# Patient Record
Sex: FEMALE | Race: OTHER | Employment: PART TIME | URBAN - METROPOLITAN AREA
[De-identification: names, ages, dates, MRNs, and addresses within clinical notes are randomized per-mention and may not be internally consistent; named-entity substitution may affect disease eponyms.]

---

## 2023-04-27 PROBLEM — F50.81 BINGE EATING DISORDER: Status: ACTIVE | Noted: 2021-12-10

## 2023-04-27 PROBLEM — F50.819 BINGE EATING DISORDER: Status: ACTIVE | Noted: 2021-12-10

## 2023-05-26 DIAGNOSIS — O99.212 OBESITY AFFECTING PREGNANCY IN SECOND TRIMESTER: ICD-10-CM

## 2023-05-26 DIAGNOSIS — Z34.92 PRENATAL CARE, SECOND TRIMESTER: ICD-10-CM

## 2023-05-26 PROBLEM — F50.81 BINGE EATING DISORDER: Status: RESOLVED | Noted: 2021-12-10 | Resolved: 2023-05-26

## 2023-05-26 PROBLEM — F50.819 BINGE EATING DISORDER: Status: RESOLVED | Noted: 2021-12-10 | Resolved: 2023-05-26

## 2023-05-26 LAB — GLUCOSE, 1HR PP: 126 MG/DL (ref 60–140)

## 2023-07-14 NOTE — RESULT ENCOUNTER NOTE
6/27/23 [facility] US:  23w 4d, EDC 10/20/23. Cephalic presentation, anatomy Complete and WNL.  grams (1lb. 6oz) at 55%. ANDRÉS WNL. Posterior, Grade 1 placenta. Cervical length 4.3 cm.     Impression:    Fetal Anatomy complete and WNL  Reassuring cervical length  Appropriate interval growth - 55%    Recommendation:  Growth US every 4 weeks  Weekly BPP at 32 weeks

## 2023-07-27 ENCOUNTER — HOSPITAL ENCOUNTER (OUTPATIENT)
Dept: ULTRASOUND IMAGING | Age: 28
Discharge: HOME | End: 2023-07-29
Attending: ADVANCED PRACTICE MIDWIFE
Payer: COMMERCIAL

## 2023-07-27 DIAGNOSIS — Z36.89 ENCOUNTER FOR ULTRASOUND TO ASSESS INTERVAL GROWTH OF FETUS: ICD-10-CM

## 2023-07-27 DIAGNOSIS — O99.212 OBESITY AFFECTING PREGNANCY IN SECOND TRIMESTER: ICD-10-CM

## 2023-07-27 PROCEDURE — 76815 OB US LIMITED FETUS(S): CPT

## 2023-07-28 NOTE — RESULT ENCOUNTER NOTE
7/27/23 US:  28w 0d, EDC 10/19/23. Breech presentation, anatomy WNL. EFW 1129 grams (2lb. 8oz) at 59%. ANDRÉS 18 cm. Posterior placenta, not low-lying. Cervical length 3.6 cm.     Impression:    Fetal Anatomy WNL  Reassuring cervical length  Appropriate interval growth - 59%

## 2023-08-18 ENCOUNTER — ROUTINE PRENATAL (OUTPATIENT)
Dept: OBGYN CLINIC | Age: 28
End: 2023-08-18

## 2023-08-18 VITALS
DIASTOLIC BLOOD PRESSURE: 76 MMHG | SYSTOLIC BLOOD PRESSURE: 138 MMHG | WEIGHT: 293 LBS | BODY MASS INDEX: 49.34 KG/M2 | HEART RATE: 89 BPM

## 2023-08-18 DIAGNOSIS — Z3A.30 30 WEEKS GESTATION OF PREGNANCY: ICD-10-CM

## 2023-08-18 DIAGNOSIS — Z34.93 PRENATAL CARE, THIRD TRIMESTER: Primary | ICD-10-CM

## 2023-08-18 DIAGNOSIS — O99.212 OBESITY AFFECTING PREGNANCY IN SECOND TRIMESTER: ICD-10-CM

## 2023-08-19 NOTE — PROGRESS NOTES
SUBJECTIVE:  Denies bleeding, spotting, leaking of fluid, abnormal discharge. Good fetal movement  Beginning to feel more pelvic pressure, discomfort when standing/walking for longer periods of time. Work does allow her to sit/rest when needed. Discussed scheduling her next growth US (8/22/23) and scheduling out her weekly BPP's starting on 9/1/23. Review of Systems   Eyes:  Negative for visual disturbance. Respiratory:  Negative for shortness of breath. Gastrointestinal:  Negative for abdominal pain, constipation, diarrhea, nausea and vomiting. Genitourinary:  Negative for dysuria, vaginal bleeding and vaginal discharge. Neurological:  Negative for headaches. OBJECTIVE:  Physical Exam  Appearance:  Normal appearance  Cardiovascular:  Normal rate, Capillary refill less than 2 seconds  Pulmonary:  Normal effort, no distress  Abdominal:  No tenderness  MS:  No Swelling, No dependent edema  Skin:  Warm, dry  Neuro:  Alert and oriented x3, reflexes normal.  Psychiatric:  Normal mood and behavior    ASSESSMENT:  34 y.o. female [id number] IUP at 30w4d    Patient Active Problem List    Diagnosis Date Noted    Obesity affecting pregnancy in second trimester 05/26/2023    Shoulder dystocia, delivered 04/27/2023      Diagnosis Orders   1. Prenatal care, third trimester        2. 30 weeks gestation of pregnancy        3. Obesity affecting pregnancy in second trimester    [place] [address] NON STRESS TESTING    US OB 1 OR MORE FETUS LIMITED          PLAN:  Growth US every 4 weeks - 8/22/23, 9/19/23, 10/17/23  Weekly BPP starting at 32 weeks - 8/9/23 x8 weeks  Weekly PNV with NST starting at 32 weeks through 10/20/23    Follow-Up  Return in 2 weeks (on 9/1/2023) for weekly weekly prenatal visits with NST through 10/20/23.     EVY Salazar CNM

## 2023-08-29 ENCOUNTER — HOSPITAL ENCOUNTER (OUTPATIENT)
Age: 28
Discharge: HOME | End: 2023-08-29
Attending: ADVANCED PRACTICE MIDWIFE | Admitting: OBSTETRICS & GYNECOLOGY
Payer: COMMERCIAL

## 2023-08-29 ENCOUNTER — HOSPITAL ENCOUNTER (OUTPATIENT)
Dept: ULTRASOUND IMAGING | Age: 28
Discharge: HOME | End: 2023-08-31
Attending: ADVANCED PRACTICE MIDWIFE
Payer: COMMERCIAL

## 2023-08-29 VITALS
DIASTOLIC BLOOD PRESSURE: 72 MMHG | HEART RATE: 87 BPM | TEMPERATURE: 98.8 F | RESPIRATION RATE: 18 BRPM | SYSTOLIC BLOOD PRESSURE: 129 MMHG

## 2023-08-29 DIAGNOSIS — O99.212 OBESITY AFFECTING PREGNANCY IN SECOND TRIMESTER: ICD-10-CM

## 2023-08-29 PROBLEM — R10.2 PELVIC PRESSURE IN PREGNANCY, ANTEPARTUM, THIRD TRIMESTER: Status: ACTIVE | Noted: 2023-08-29

## 2023-08-29 PROBLEM — O26.893 PELVIC PRESSURE IN PREGNANCY, ANTEPARTUM, THIRD TRIMESTER: Status: ACTIVE | Noted: 2023-08-29

## 2023-08-29 LAB
AMPHET UR QL SCN: NORMAL
BACTERIA URNS QL MICRO: NEGATIVE /HPF
BARBITURATES UR QL SCN: NORMAL
BENZODIAZ UR QL SCN: NORMAL
BILIRUB UR QL STRIP: NEGATIVE
CANNABINOIDS UR QL SCN: NORMAL
CLARITY UR: CLEAR
COCAINE UR QL SCN: NORMAL
COLOR UR: YELLOW
DRUG SCREEN COMMENT UR-IMP: NORMAL
EPI CELLS #/AREA URNS AUTO: ABNORMAL /HPF (ref 0–5)
FENTANYL SCREEN, URINE: NORMAL
GLUCOSE UR STRIP-MCNC: NEGATIVE MG/DL
HGB UR QL STRIP: NEGATIVE
HYALINE CASTS #/AREA URNS AUTO: ABNORMAL /HPF (ref 0–5)
KETONES UR STRIP-MCNC: 15 MG/DL
LEUKOCYTE ESTERASE UR QL STRIP: ABNORMAL
METHADONE UR QL SCN: NORMAL
NITRITE UR QL STRIP: NEGATIVE
OPIATES UR QL SCN: NORMAL
OXYCODONE UR QL SCN: NORMAL
PCP UR QL SCN: NORMAL
PH UR STRIP: 6 [PH] (ref 5–9)
PROPOXYPH UR QL SCN: NORMAL
PROT UR STRIP-MCNC: NEGATIVE MG/DL
RBC #/AREA URNS AUTO: ABNORMAL /HPF (ref 0–5)
SP GR UR STRIP: 1.02 (ref 1–1.03)
URINE REFLEX TO CULTURE: ABNORMAL
UROBILINOGEN UR STRIP-ACNC: 1 E.U./DL
WBC #/AREA URNS AUTO: ABNORMAL /HPF (ref 0–5)

## 2023-08-29 PROCEDURE — 99283 EMERGENCY DEPT VISIT LOW MDM: CPT

## 2023-08-29 PROCEDURE — 76815 OB US LIMITED FETUS(S): CPT

## 2023-08-29 PROCEDURE — 59025 FETAL NON-STRESS TEST: CPT

## 2023-08-29 PROCEDURE — 99283 EMERGENCY DEPT VISIT LOW MDM: CPT | Performed by: OBSTETRICS & GYNECOLOGY

## 2023-08-29 PROCEDURE — 80307 DRUG TEST PRSMV CHEM ANLYZR: CPT

## 2023-08-29 PROCEDURE — 81001 URINALYSIS AUTO W/SCOPE: CPT

## 2023-08-29 PROCEDURE — 59025 FETAL NON-STRESS TEST: CPT | Performed by: OBSTETRICS & GYNECOLOGY

## 2023-08-29 NOTE — ED NOTES
Department of Obstetrics and Gynecology   Emergency Department, OB triage    Pt Name: Boby Echols  MRN: [id number] 5 [facility], [place] #: [de-identified]  YOB: 1995  Estimated Date of Delivery: 10/24/23      HPI: The patient is a 34 y.o. [id number] 32w0d female who presents to [facility] triage for vaginal pressure. Pt with h/o TSVD x 2.   +FM, -VB, -LOF, -CTX    Allergies: Allergies as of 08/29/2023 - Fully Reviewed 08/29/2023   Allergen Reaction Noted    Shellfish allergy  11/06/2022    Shellfish-derived products Other (See Comments) 03/10/2023       Medications:  No current facility-administered medications for this encounter. Current Outpatient Medications:     penicillin v potassium (VEETID) 500 MG tablet, TAKE 1 TABLET BY MOUTH FOUR TIMES DAILY UNTIL GONE (Patient not taking: Reported on 8/29/2023), Disp: , Rfl:     ondansetron (ZOFRAN-ODT) 4 MG disintegrating tablet, DISSOLVE 1 TABLET ON TOP OF YOUR TONGUE, THEN SWALLOW EVERY 4 HOURS AS NEEDED TO RELIEVE NAUSEA AND VOMITING, Disp: 30 tablet, Rfl: 2    aspirin (ASPIRIN CHILDRENS) 81 MG chewable tablet, Take 1 tablet by mouth daily, Disp: 90 tablet, Rfl: 1    PAIN RELIEVER EXTRA STRENGTH 500 MG tablet, , Disp: , Rfl:     cyclobenzaprine (FLEXERIL) 10 MG tablet, , Disp: , Rfl:     lidocaine (LIDODERM) 5 %, , Disp: , Rfl:     Prenatal Vit-Fe Fumarate-FA (PRENATAL VITAMIN) 27-0.8 MG TABS, Take 1 tablet by mouth daily, Disp: 90 tablet, Rfl: 3    OB History: [id number]    Gyn History: Denies h/o abnormal pap smear, h/o STDs. Past Medical History:   Past Medical History:   Diagnosis Date    Anxiety and depression 2023    Asthma 2005    Binge eating disorder 12/10/2021       Past Surgical History: No past surgical history on file. Social History:   Social History     Tobacco Use   Smoking Status Never   Smokeless Tobacco Never        Family History: Noncontributory; Denies h/o cancer.      ROS:  Negative except as stated in HPI, denies nausea, vomiting, fever,

## 2023-08-29 NOTE — PROGRESS NOTES
Pt here with complaints of pressure in lower abdomen and vagina. Pt states about a week ago she \"peed herself\" a small amount ,and her dr told her it was normal because babys head is low. Pt states she has never felt this much pressure and it is uncomfortable to walk. Urine sample provided and pt getting into gown.

## 2023-08-29 NOTE — PROGRESS NOTES
Discharge instructions  and labor precautions reviewed, pt states understanding, states  is waiting in lobby and they are going to u/s  pt given water pitcher to prepare for u/s

## 2023-09-01 ENCOUNTER — ROUTINE PRENATAL (OUTPATIENT)
Dept: OBGYN CLINIC | Age: 28
End: 2023-09-01

## 2023-09-01 VITALS
HEART RATE: 98 BPM | WEIGHT: 293 LBS | SYSTOLIC BLOOD PRESSURE: 118 MMHG | BODY MASS INDEX: 49.65 KG/M2 | DIASTOLIC BLOOD PRESSURE: 70 MMHG

## 2023-09-01 DIAGNOSIS — O99.213 OBESITY AFFECTING PREGNANCY IN THIRD TRIMESTER: ICD-10-CM

## 2023-09-01 DIAGNOSIS — Z3A.32 32 WEEKS GESTATION OF PREGNANCY: ICD-10-CM

## 2023-09-01 DIAGNOSIS — Z36.89 NST (NON-STRESS TEST) REACTIVE: ICD-10-CM

## 2023-09-01 DIAGNOSIS — Z34.93 PRENATAL CARE, THIRD TRIMESTER: Primary | ICD-10-CM

## 2023-09-01 NOTE — RESULT ENCOUNTER NOTE
8/29/23 US:  32w 5d, EDC 10/19/23. Variable presentation, anatomy WNL. EFW 1987 grams (4lb. 6oz) at 56%. ANDRÉS 19.6 cm. Posterior placenta, not low-lying.     Impression:    Fetal Anatomy WNL  Reassuring cervical length  Appropriate interval growth - 56%

## 2023-09-01 NOTE — PROGRESS NOTES
SUBJECTIVE:  Denies bleeding, spotting, leaking of fluid, abnormal discharge. Good fetal movement  She has not completed 28 week labs, strongly encouraged her to get this done next week. Review of Systems   Eyes:  Negative for visual disturbance. Respiratory:  Negative for shortness of breath. Gastrointestinal:  Negative for abdominal pain, constipation, diarrhea, nausea and vomiting. Genitourinary:  Negative for dysuria, vaginal bleeding and vaginal discharge. Neurological:  Negative for headaches. OBJECTIVE:  NST:    NST Indication - Obesity complicating pregnancy  Total Run Time - 20 minutes  FHR - 135, moderate variability, 15x15 accelerations, No decelerations. Uterus - No contractions, resting tone soft  NST Interventions - None  Fetal Response - N/A  NST Outcome - reactive    8/29/23 US:  Coty Carrillo, EDC 10/19/23. Variable presentation, anatomy WNL. EFW 1987 grams (4lb. 6oz) at 56%. ANDRÉS 19.6 cm. Posterior placenta, not low-lying. Physical Exam  Appearance:  Normal appearance  Cardiovascular:  Normal rate, Capillary refill less than 2 seconds  Pulmonary:  Normal effort, no distress  Abdominal:  No tenderness  MS:  No Swelling, No dependent edema  Skin:  Warm, dry  Neuro:  Alert and oriented x3, reflexes normal.  Psychiatric:  Normal mood and behavior    ASSESSMENT:  34 y.o. female [id number] IUP at 32w3d  Fetal Anatomy WNL  Reassuring cervical length  Appropriate interval growth - 56%     Patient Active Problem List    Diagnosis Date Noted    Pelvic pressure in pregnancy, antepartum, third trimester 08/29/2023    Obesity affecting pregnancy in third trimester 05/26/2023     16wk 1-hour GTT:  126  28wk 1-hour GTT:  ______        Shoulder dystocia, delivered 04/27/2023      Diagnosis Orders   1. Prenatal care, third trimester        2. 32 weeks gestation of pregnancy        3.  Obesity affecting pregnancy in third trimester  04703 - [place] FETAL NON-STRESS TEST      4. NST (non-stress test)

## 2023-09-05 ENCOUNTER — HOSPITAL ENCOUNTER (OUTPATIENT)
Dept: ULTRASOUND IMAGING | Age: 28
Discharge: HOME | End: 2023-09-07
Attending: ADVANCED PRACTICE MIDWIFE
Payer: COMMERCIAL

## 2023-09-05 DIAGNOSIS — O99.212 OBESITY AFFECTING PREGNANCY IN SECOND TRIMESTER: ICD-10-CM

## 2023-09-05 PROCEDURE — 76819 FETAL BIOPHYS PROFIL W/O NST: CPT

## 2023-09-08 ENCOUNTER — ROUTINE PRENATAL (OUTPATIENT)
Dept: OBGYN CLINIC | Age: 28
End: 2023-09-08

## 2023-09-08 VITALS
SYSTOLIC BLOOD PRESSURE: 136 MMHG | HEART RATE: 105 BPM | BODY MASS INDEX: 49.34 KG/M2 | WEIGHT: 293 LBS | DIASTOLIC BLOOD PRESSURE: 82 MMHG

## 2023-09-08 DIAGNOSIS — Z36.89 NST (NON-STRESS TEST) REACTIVE: ICD-10-CM

## 2023-09-08 DIAGNOSIS — Z34.92 PRENATAL CARE, SECOND TRIMESTER: ICD-10-CM

## 2023-09-08 DIAGNOSIS — O99.213 OBESITY AFFECTING PREGNANCY IN THIRD TRIMESTER: ICD-10-CM

## 2023-09-08 DIAGNOSIS — Z3A.33 33 WEEKS GESTATION OF PREGNANCY: ICD-10-CM

## 2023-09-08 DIAGNOSIS — Z34.93 PRENATAL CARE, THIRD TRIMESTER: Primary | ICD-10-CM

## 2023-09-08 LAB
BASOPHILS # BLD: 0 K/UL (ref 0–0.2)
BASOPHILS NFR BLD: 0.2 %
EOSINOPHIL # BLD: 0.1 K/UL (ref 0–0.7)
EOSINOPHIL NFR BLD: 0.9 %
ERYTHROCYTE [DISTWIDTH] IN BLOOD BY AUTOMATED COUNT: 13.9 % (ref 11.5–14.5)
GLUCOSE, 1HR PP: 132 MG/DL (ref 60–140)
HCT VFR BLD AUTO: 36.3 % (ref 37–47)
HGB BLD-MCNC: 11.9 G/DL (ref 12–16)
LYMPHOCYTES # BLD: 1.8 K/UL (ref 1–4.8)
LYMPHOCYTES NFR BLD: 23.2 %
MCH RBC QN AUTO: 28.1 PG (ref 27–31.3)
MCHC RBC AUTO-ENTMCNC: 32.7 % (ref 33–37)
MCV RBC AUTO: 86 FL (ref 79.4–94.8)
MONOCYTES # BLD: 0.2 K/UL (ref 0.2–0.8)
MONOCYTES NFR BLD: 3 %
NEUTROPHILS # BLD: 5.6 K/UL (ref 1.4–6.5)
NEUTS SEG NFR BLD: 72.7 %
PLATELET # BLD AUTO: 153 K/UL (ref 130–400)
RBC # BLD AUTO: 4.22 M/UL (ref 4.2–5.4)
WBC # BLD AUTO: 7.8 K/UL (ref 4.8–10.8)

## 2023-09-08 NOTE — PROGRESS NOTES
SUBJECTIVE:  Denies bleeding, spotting, leaking of fluid, abnormal discharge. Good fetal movement  Doing well, no concerns today. Collecting her 28 week labs. Review of Systems   Eyes:  Negative for visual disturbance. Respiratory:  Negative for shortness of breath. Gastrointestinal:  Negative for abdominal pain, constipation, diarrhea, nausea and vomiting. Genitourinary:  Negative for dysuria, vaginal bleeding and vaginal discharge. Neurological:  Negative for headaches. OBJECTIVE:  NST:    NST Indication - Obesity Complicating Pregnancy  Total Run Time - 33 minutes  FHR - 130, moderate variability, 15x15 accelerations, No decelerations. Uterus - No contractions, resting tone soft  NST Interventions - None  Fetal Response - N/A  NST Outcome - reactive    Physical Exam  Appearance:  Normal appearance  Cardiovascular:  Normal rate, Capillary refill less than 2 seconds  Pulmonary:  Normal effort, no distress  Abdominal:  No tenderness  MS:  No Swelling, No dependent edema  Skin:  Warm, dry  Neuro:  Alert and oriented x3, reflexes normal.  Psychiatric:  Normal mood and behavior    ASSESSMENT:  34 y.o. female [id number] IUP at 33w3d    Patient Active Problem List    Diagnosis Date Noted    Pelvic pressure in pregnancy, antepartum, third trimester 08/29/2023    Obesity affecting pregnancy in third trimester 05/26/2023     16wk 1-hour GTT:  126  28wk 1-hour GTT:  ______        Shoulder dystocia, delivered 04/27/2023      Diagnosis Orders   1. Prenatal care, third trimester        2. 33 weeks gestation of pregnancy        3. Obesity affecting pregnancy in third trimester  99562 - [place] FETAL NON-STRESS TEST      4. NST (non-stress test) reactive            PLAN:  Collecting 28 week labs today     Follow-Up  Return in about 1 week (around 9/15/2023) for Prenatal Visit with NST.     EVY Mcdonald CNM

## 2023-09-09 LAB — RPR SER QL: NORMAL

## 2023-09-12 ENCOUNTER — HOSPITAL ENCOUNTER (OUTPATIENT)
Dept: ULTRASOUND IMAGING | Age: 28
Discharge: HOME | End: 2023-09-14
Attending: ADVANCED PRACTICE MIDWIFE
Payer: COMMERCIAL

## 2023-09-12 DIAGNOSIS — O99.212 OBESITY AFFECTING PREGNANCY IN SECOND TRIMESTER: ICD-10-CM

## 2023-09-12 PROCEDURE — 76819 FETAL BIOPHYS PROFIL W/O NST: CPT

## 2023-09-14 ENCOUNTER — ROUTINE PRENATAL (OUTPATIENT)
Dept: OBGYN CLINIC | Age: 28
End: 2023-09-14

## 2023-09-14 VITALS
BODY MASS INDEX: 49.49 KG/M2 | DIASTOLIC BLOOD PRESSURE: 72 MMHG | SYSTOLIC BLOOD PRESSURE: 126 MMHG | HEART RATE: 102 BPM | WEIGHT: 293 LBS

## 2023-09-14 DIAGNOSIS — Z34.93 PRENATAL CARE, THIRD TRIMESTER: ICD-10-CM

## 2023-09-14 DIAGNOSIS — Z36.89 NST (NON-STRESS TEST) REACTIVE: ICD-10-CM

## 2023-09-14 DIAGNOSIS — Z3A.34 34 WEEKS GESTATION OF PREGNANCY: ICD-10-CM

## 2023-09-14 DIAGNOSIS — O99.213 OBESITY AFFECTING PREGNANCY IN THIRD TRIMESTER: Primary | ICD-10-CM

## 2023-09-19 ENCOUNTER — HOSPITAL ENCOUNTER (OUTPATIENT)
Dept: ULTRASOUND IMAGING | Age: 28
Discharge: HOME | End: 2023-09-21
Attending: ADVANCED PRACTICE MIDWIFE
Payer: COMMERCIAL

## 2023-09-19 DIAGNOSIS — O99.212 OBESITY AFFECTING PREGNANCY IN SECOND TRIMESTER: ICD-10-CM

## 2023-09-19 PROCEDURE — 76815 OB US LIMITED FETUS(S): CPT

## 2023-09-19 PROCEDURE — 76819 FETAL BIOPHYS PROFIL W/O NST: CPT

## 2023-09-20 NOTE — RESULT ENCOUNTER NOTE
9/19/23 US:  35w 6d, EDC 10/18/23. Cephalic presentation, anatomy WNL. EFW 2889 grams (6lb. 6oz) at 31%. ANDRÉS 9.3 cm. Posterior, Grade 1 placenta.     Impression:    Fetal Anatomy WNL  Cephalic Presentation  Appropriate interval growth - 31%

## 2023-09-26 ENCOUNTER — HOSPITAL ENCOUNTER (OUTPATIENT)
Dept: ULTRASOUND IMAGING | Age: 28
Discharge: HOME | End: 2023-09-28
Attending: ADVANCED PRACTICE MIDWIFE
Payer: COMMERCIAL

## 2023-09-26 DIAGNOSIS — O99.212 OBESITY AFFECTING PREGNANCY IN SECOND TRIMESTER: ICD-10-CM

## 2023-09-26 PROCEDURE — 76819 FETAL BIOPHYS PROFIL W/O NST: CPT

## 2023-09-29 ENCOUNTER — ROUTINE PRENATAL (OUTPATIENT)
Dept: OBGYN CLINIC | Age: 28
End: 2023-09-29

## 2023-09-29 VITALS
BODY MASS INDEX: 49.49 KG/M2 | SYSTOLIC BLOOD PRESSURE: 126 MMHG | DIASTOLIC BLOOD PRESSURE: 78 MMHG | HEART RATE: 103 BPM | WEIGHT: 293 LBS

## 2023-09-29 DIAGNOSIS — Z34.93 PRENATAL CARE, THIRD TRIMESTER: Primary | ICD-10-CM

## 2023-09-29 DIAGNOSIS — Z3A.36 36 WEEKS GESTATION OF PREGNANCY: ICD-10-CM

## 2023-09-29 DIAGNOSIS — O99.213 OBESITY AFFECTING PREGNANCY IN THIRD TRIMESTER: ICD-10-CM

## 2023-09-29 DIAGNOSIS — Z36.89 NST (NON-STRESS TEST) REACTIVE: ICD-10-CM

## 2023-09-29 PROBLEM — J45.909 UNCOMPLICATED ASTHMA: Status: ACTIVE | Noted: 2023-06-27

## 2023-09-29 PROBLEM — O99.210 OBESITY COMPLICATING PREGNANCY: Status: ACTIVE | Noted: 2023-05-26

## 2023-09-29 NOTE — PROGRESS NOTES
SUBJECTIVE:  Denies bleeding, spotting, leaking of fluid, abnormal discharge. Good fetal movement  Doing well, no concerns today. Discussed screening for GBS today. Review of Systems   Eyes:  Negative for visual disturbance. Respiratory:  Negative for shortness of breath. Gastrointestinal:  Negative for abdominal pain, constipation, diarrhea, nausea and vomiting. Genitourinary:  Negative for dysuria, vaginal bleeding and vaginal discharge. Neurological:  Negative for headaches. OBJECTIVE:  Cervical Exam:  closed/0%/Floating, Posterior, Soft. GBS collected. NST:    NST Indication - Obesity Complicating Pregnancy  Total Run Time - 12 minutes  FHR - 135, moderate variability, 15x15 accelerations, No decelerations. Uterus - No contractions, resting tone soft  NST Interventions - None  Fetal Response - N/A  NST Outcome - reactive    9/26/23 US:  BPP 8/8, ANDRÉS 9.7 cm.    9/19/23 US:  35w 6d, EDC 10/18/23. Cephalic presentation, anatomy WNL. EFW 2889 grams (6lb. 6oz) at 31%. BPP 8/8, ANDRÉS 9.3 cm. Posterior, Grade 1 placenta. Physical Exam  Appearance:  Normal appearance  Cardiovascular:  Normal rate, Capillary refill less than 2 seconds  Pulmonary:  Normal effort, no distress  Abdominal:  No tenderness  MS:  No Swelling, No dependent edema  Skin:  Warm, dry  Neuro:  Alert and oriented x3, reflexes normal.  Psychiatric:  Normal mood and behavior    ASSESSMENT:  34 y.o. female [id number] IUP at 36w3d    Patient Active Problem List    Diagnosis Date Noted    Pelvic pressure in pregnancy, antepartum, third trimester 08/29/2023    Obesity affecting pregnancy in third trimester 05/26/2023     16wk 1-hour GTT:  126  28wk 1-hour GTT:  132      Shoulder dystocia, delivered 04/27/2023      Diagnosis Orders   1. Prenatal care, third trimester        2. 36 weeks gestation of pregnancy  Culture, Strep B Screen, Vaginal/Rectal      3.  Obesity affecting pregnancy in third trimester  72190 - [place] FETAL NON-STRESS

## 2023-10-02 LAB — GP B STREP SPEC QL CULT: NORMAL

## 2023-10-04 ENCOUNTER — ROUTINE PRENATAL (OUTPATIENT)
Dept: OBGYN CLINIC | Age: 28
End: 2023-10-04
Payer: COMMERCIAL

## 2023-10-04 VITALS
HEART RATE: 92 BPM | SYSTOLIC BLOOD PRESSURE: 132 MMHG | DIASTOLIC BLOOD PRESSURE: 80 MMHG | BODY MASS INDEX: 49.65 KG/M2 | WEIGHT: 293 LBS

## 2023-10-04 DIAGNOSIS — Z3A.37 37 WEEKS GESTATION OF PREGNANCY: ICD-10-CM

## 2023-10-04 DIAGNOSIS — Z36.89 NST (NON-STRESS TEST) REACTIVE: ICD-10-CM

## 2023-10-04 DIAGNOSIS — O99.213 OBESITY AFFECTING PREGNANCY IN THIRD TRIMESTER, UNSPECIFIED OBESITY TYPE: ICD-10-CM

## 2023-10-04 DIAGNOSIS — Z34.93 PRENATAL CARE, THIRD TRIMESTER: Primary | ICD-10-CM

## 2023-10-04 PROBLEM — R10.2 PELVIC PRESSURE IN PREGNANCY, ANTEPARTUM, THIRD TRIMESTER: Status: RESOLVED | Noted: 2023-08-29 | Resolved: 2023-10-04

## 2023-10-04 PROBLEM — J45.909 UNCOMPLICATED ASTHMA: Status: RESOLVED | Noted: 2023-06-27 | Resolved: 2023-10-04

## 2023-10-04 PROBLEM — O26.893 PELVIC PRESSURE IN PREGNANCY, ANTEPARTUM, THIRD TRIMESTER: Status: RESOLVED | Noted: 2023-08-29 | Resolved: 2023-10-04

## 2023-10-04 PROCEDURE — 59025 FETAL NON-STRESS TEST: CPT | Performed by: ADVANCED PRACTICE MIDWIFE

## 2023-10-04 PROCEDURE — G8427 DOCREV CUR MEDS BY ELIG CLIN: HCPCS | Performed by: ADVANCED PRACTICE MIDWIFE

## 2023-10-04 PROCEDURE — G8484 FLU IMMUNIZE NO ADMIN: HCPCS | Performed by: ADVANCED PRACTICE MIDWIFE

## 2023-10-04 PROCEDURE — 1036F TOBACCO NON-USER: CPT | Performed by: ADVANCED PRACTICE MIDWIFE

## 2023-10-04 PROCEDURE — 99213 OFFICE O/P EST LOW 20 MIN: CPT | Performed by: ADVANCED PRACTICE MIDWIFE

## 2023-10-04 PROCEDURE — G8419 CALC BMI OUT NRM PARAM NOF/U: HCPCS | Performed by: ADVANCED PRACTICE MIDWIFE

## 2023-10-04 NOTE — PROGRESS NOTES
SUBJECTIVE:  Denies bleeding, spotting, leaking of fluid, abnormal discharge. Good fetal movement  Doing well, no concerns    Review of Systems   Eyes:  Negative for visual disturbance. Respiratory:  Negative for shortness of breath. Gastrointestinal:  Negative for abdominal pain, constipation, diarrhea, nausea and vomiting. Genitourinary:  Negative for dysuria, vaginal bleeding and vaginal discharge. Neurological:  Negative for headaches. OBJECTIVE:  Cervical Exam:  1/0%/-3, Posterior, Medium. NST:    NST Indication - Obesity Complicating Pregnancy  Total Run Time - 13 minutes  FHR - 145, moderate variability, 15x15 accelerations, No decelerations. Uterus - No contractions, resting tone soft  NST Interventions - None  Fetal Response - N/A  NST Outcome - reactive    Physical Exam  Appearance:  Normal appearance  Cardiovascular:  Normal rate, Capillary refill less than 2 seconds  Pulmonary:  Normal effort, no distress  Abdominal:  No tenderness  MS:  No Swelling, No dependent edema  Skin:  Warm, dry  Neuro:  Alert and oriented x3, reflexes normal.  Psychiatric:  Normal mood and behavior    ASSESSMENT:  34 y.o. female [id number] IUP at 37w1d    Patient Active Problem List    Diagnosis Date Noted    Obesity complicating pregnancy 15/64/0695     16wk 1-hour GTT:  126  28wk 1-hour GTT:  132    MFM Recommendations:  US for growth at her provider office Q 4 weeks. Weekly BPP after 32 weeks. Shoulder dystocia, delivered 04/27/2023      Diagnosis Orders   1. Prenatal care, third trimester        2. 37 weeks gestation of pregnancy        3. Obesity affecting pregnancy in third trimester, unspecified obesity type  30595 - [place] FETAL NON-STRESS TEST      4. NST (non-stress test) reactive            PLAN:  She is deciding if she would like to be induced or wait for natural labor onset. Follow-Up  Return in about 2 weeks (around 10/18/2023) for Prenatal Visit with NST.     EVY Kirkland CNM

## 2023-10-12 ENCOUNTER — ANESTHESIA EVENT (OUTPATIENT)
Dept: LABOR AND DELIVERY | Age: 28
DRG: 560 | End: 2023-10-12
Payer: COMMERCIAL

## 2023-10-12 ENCOUNTER — HOSPITAL ENCOUNTER (INPATIENT)
Age: 28
LOS: 2 days | Discharge: HOME | DRG: 560 | End: 2023-10-14
Attending: OBSTETRICS & GYNECOLOGY | Admitting: OBSTETRICS & GYNECOLOGY
Payer: COMMERCIAL

## 2023-10-12 ENCOUNTER — ANESTHESIA (OUTPATIENT)
Dept: LABOR AND DELIVERY | Age: 28
DRG: 560 | End: 2023-10-12
Payer: COMMERCIAL

## 2023-10-12 ENCOUNTER — ROUTINE PRENATAL (OUTPATIENT)
Dept: OBGYN CLINIC | Age: 28
End: 2023-10-12

## 2023-10-12 VITALS
SYSTOLIC BLOOD PRESSURE: 120 MMHG | HEART RATE: 86 BPM | BODY MASS INDEX: 49.81 KG/M2 | DIASTOLIC BLOOD PRESSURE: 70 MMHG | WEIGHT: 293 LBS

## 2023-10-12 DIAGNOSIS — Z34.93 PRENATAL CARE, THIRD TRIMESTER: ICD-10-CM

## 2023-10-12 DIAGNOSIS — Z3A.38 38 WEEKS GESTATION OF PREGNANCY: Primary | ICD-10-CM

## 2023-10-12 DIAGNOSIS — O99.213 OBESITY AFFECTING PREGNANCY IN THIRD TRIMESTER, UNSPECIFIED OBESITY TYPE: ICD-10-CM

## 2023-10-12 LAB
ABO + RH BLD: NORMAL
ALBUMIN SERPL-MCNC: 3.4 G/DL (ref 3.5–4.6)
ALP SERPL-CCNC: 145 U/L (ref 40–130)
ALT SERPL-CCNC: 8 U/L (ref 0–33)
AMPHET UR QL SCN: NORMAL
ANION GAP SERPL CALCULATED.3IONS-SCNC: 12 MEQ/L (ref 9–15)
AST SERPL-CCNC: 15 U/L (ref 0–35)
BARBITURATES UR QL SCN: NORMAL
BASOPHILS # BLD: 0 K/UL (ref 0–0.2)
BASOPHILS NFR BLD: 0.1 %
BENZODIAZ UR QL SCN: NORMAL
BILIRUB SERPL-MCNC: 0.4 MG/DL (ref 0.2–0.7)
BILIRUB UR QL STRIP: NEGATIVE
BLD GP AB SCN SERPL QL: NORMAL
BUN SERPL-MCNC: 5 MG/DL (ref 6–20)
CALCIUM SERPL-MCNC: 8.9 MG/DL (ref 8.5–9.9)
CANNABINOIDS UR QL SCN: NORMAL
CHLORIDE SERPL-SCNC: 105 MEQ/L (ref 95–107)
CLARITY UR: CLEAR
CO2 SERPL-SCNC: 19 MEQ/L (ref 20–31)
COCAINE UR QL SCN: NORMAL
COLOR UR: YELLOW
CREAT SERPL-MCNC: 0.35 MG/DL (ref 0.5–0.9)
CREAT UR-MCNC: 68.6 MG/DL
DRUG SCREEN COMMENT UR-IMP: NORMAL
EOSINOPHIL # BLD: 0.1 K/UL (ref 0–0.7)
EOSINOPHIL NFR BLD: 0.7 %
ERYTHROCYTE [DISTWIDTH] IN BLOOD BY AUTOMATED COUNT: 13.6 % (ref 11.5–14.5)
FENTANYL SCREEN, URINE: NORMAL
GLOBULIN SER CALC-MCNC: 3 G/DL (ref 2.3–3.5)
GLUCOSE SERPL-MCNC: 140 MG/DL (ref 70–99)
GLUCOSE UR STRIP-MCNC: NEGATIVE MG/DL
HBV SURFACE AG SERPL QL IA: NORMAL
HCT VFR BLD AUTO: 39.6 % (ref 37–47)
HGB BLD-MCNC: 12.7 G/DL (ref 12–16)
HGB UR QL STRIP: NEGATIVE
KETONES UR STRIP-MCNC: NEGATIVE MG/DL
LDH SERPL-CCNC: 168 U/L (ref 135–214)
LEUKOCYTE ESTERASE UR QL STRIP: NEGATIVE
LYMPHOCYTES # BLD: 2.4 K/UL (ref 1–4.8)
LYMPHOCYTES NFR BLD: 29.3 %
MCH RBC QN AUTO: 28 PG (ref 27–31.3)
MCHC RBC AUTO-ENTMCNC: 32.1 % (ref 33–37)
MCV RBC AUTO: 87.4 FL (ref 79.4–94.8)
METHADONE UR QL SCN: NORMAL
MONOCYTES # BLD: 0.3 K/UL (ref 0.2–0.8)
MONOCYTES NFR BLD: 3.4 %
NEUTROPHILS # BLD: 5.4 K/UL (ref 1.4–6.5)
NEUTS SEG NFR BLD: 66.1 %
NITRITE UR QL STRIP: NEGATIVE
OPIATES UR QL SCN: NORMAL
OXYCODONE UR QL SCN: NORMAL
PCP UR QL SCN: NORMAL
PH UR STRIP: 7 [PH] (ref 5–9)
PLATELET # BLD AUTO: 160 K/UL (ref 130–400)
POTASSIUM SERPL-SCNC: 3.7 MEQ/L (ref 3.4–4.9)
PROPOXYPH UR QL SCN: NORMAL
PROT SERPL-MCNC: 6.4 G/DL (ref 6.3–8)
PROT UR STRIP-MCNC: NEGATIVE MG/DL
PROT UR-MCNC: 14 MG/DL
PROT/CREAT UR-RTO: 0.2 ML/ML
PROT/CREAT UR-RTO: 0.2 ML/ML (ref 0–0.2)
RBC # BLD AUTO: 4.53 M/UL (ref 4.2–5.4)
SODIUM SERPL-SCNC: 136 MEQ/L (ref 135–144)
SP GR UR STRIP: 1.01 (ref 1–1.03)
URATE SERPL-MCNC: 4.3 MG/DL (ref 2.4–5.7)
URINE REFLEX TO CULTURE: NORMAL
UROBILINOGEN UR STRIP-ACNC: 1 E.U./DL
WBC # BLD AUTO: 8.2 K/UL (ref 4.8–10.8)

## 2023-10-12 PROCEDURE — 1220000000 HC SEMI PRIVATE OB R&B

## 2023-10-12 PROCEDURE — 86850 RBC ANTIBODY SCREEN: CPT

## 2023-10-12 PROCEDURE — 80307 DRUG TEST PRSMV CHEM ANLYZR: CPT

## 2023-10-12 PROCEDURE — 86592 SYPHILIS TEST NON-TREP QUAL: CPT

## 2023-10-12 PROCEDURE — 84550 ASSAY OF BLOOD/URIC ACID: CPT

## 2023-10-12 PROCEDURE — 86901 BLOOD TYPING SEROLOGIC RH(D): CPT

## 2023-10-12 PROCEDURE — 6370000000 HC RX 637 (ALT 250 FOR IP): Performed by: OBSTETRICS & GYNECOLOGY

## 2023-10-12 PROCEDURE — 59025 FETAL NON-STRESS TEST: CPT | Performed by: OBSTETRICS & GYNECOLOGY

## 2023-10-12 PROCEDURE — 83615 LACTATE (LD) (LDH) ENZYME: CPT

## 2023-10-12 PROCEDURE — 81003 URINALYSIS AUTO W/O SCOPE: CPT

## 2023-10-12 PROCEDURE — 86900 BLOOD TYPING SEROLOGIC ABO: CPT

## 2023-10-12 PROCEDURE — 2580000003 HC RX 258: Performed by: OBSTETRICS & GYNECOLOGY

## 2023-10-12 PROCEDURE — 87340 HEPATITIS B SURFACE AG IA: CPT

## 2023-10-12 PROCEDURE — 85025 COMPLETE CBC W/AUTO DIFF WBC: CPT

## 2023-10-12 PROCEDURE — 80053 COMPREHEN METABOLIC PANEL: CPT

## 2023-10-12 PROCEDURE — 84156 ASSAY OF PROTEIN URINE: CPT

## 2023-10-12 RX ORDER — SODIUM CHLORIDE, SODIUM LACTATE, POTASSIUM CHLORIDE, AND CALCIUM CHLORIDE .6; .31; .03; .02 G/100ML; G/100ML; G/100ML; G/100ML
1000 INJECTION, SOLUTION INTRAVENOUS PRN
Status: DISCONTINUED | OUTPATIENT
Start: 2023-10-12 | End: 2023-10-14 | Stop reason: HOSPADM

## 2023-10-12 RX ORDER — DOCUSATE SODIUM 100 MG/1
100 CAPSULE, LIQUID FILLED ORAL 2 TIMES DAILY PRN
Status: DISCONTINUED | OUTPATIENT
Start: 2023-10-12 | End: 2023-10-14 | Stop reason: HOSPADM

## 2023-10-12 RX ORDER — ONDANSETRON 2 MG/ML
4 INJECTION INTRAMUSCULAR; INTRAVENOUS EVERY 6 HOURS PRN
Status: DISCONTINUED | OUTPATIENT
Start: 2023-10-12 | End: 2023-10-13

## 2023-10-12 RX ORDER — NALBUPHINE HYDROCHLORIDE 20 MG/ML
5 INJECTION, SOLUTION INTRAMUSCULAR; INTRAVENOUS; SUBCUTANEOUS
Status: ACTIVE | OUTPATIENT
Start: 2023-10-12 | End: 2023-10-13

## 2023-10-12 RX ORDER — MISOPROSTOL 200 UG/1
400 TABLET ORAL PRN
Status: DISCONTINUED | OUTPATIENT
Start: 2023-10-12 | End: 2023-10-14 | Stop reason: HOSPADM

## 2023-10-12 RX ORDER — SODIUM CHLORIDE 9 MG/ML
INJECTION, SOLUTION INTRAVENOUS PRN
Status: DISCONTINUED | OUTPATIENT
Start: 2023-10-12 | End: 2023-10-14 | Stop reason: HOSPADM

## 2023-10-12 RX ORDER — ACETAMINOPHEN 325 MG/1
650 TABLET ORAL EVERY 4 HOURS PRN
Status: DISCONTINUED | OUTPATIENT
Start: 2023-10-12 | End: 2023-10-14 | Stop reason: HOSPADM

## 2023-10-12 RX ORDER — DIPHENHYDRAMINE HCL 25 MG
25 TABLET ORAL EVERY 4 HOURS PRN
Status: DISCONTINUED | OUTPATIENT
Start: 2023-10-12 | End: 2023-10-14 | Stop reason: HOSPADM

## 2023-10-12 RX ORDER — SODIUM CHLORIDE 0.9 % (FLUSH) 0.9 %
5-40 SYRINGE (ML) INJECTION PRN
Status: DISCONTINUED | OUTPATIENT
Start: 2023-10-12 | End: 2023-10-14 | Stop reason: HOSPADM

## 2023-10-12 RX ORDER — SODIUM CHLORIDE, SODIUM LACTATE, POTASSIUM CHLORIDE, CALCIUM CHLORIDE 600; 310; 30; 20 MG/100ML; MG/100ML; MG/100ML; MG/100ML
INJECTION, SOLUTION INTRAVENOUS CONTINUOUS
Status: DISCONTINUED | OUTPATIENT
Start: 2023-10-12 | End: 2023-10-13

## 2023-10-12 RX ORDER — SODIUM CHLORIDE, SODIUM LACTATE, POTASSIUM CHLORIDE, AND CALCIUM CHLORIDE .6; .31; .03; .02 G/100ML; G/100ML; G/100ML; G/100ML
500 INJECTION, SOLUTION INTRAVENOUS PRN
Status: DISCONTINUED | OUTPATIENT
Start: 2023-10-12 | End: 2023-10-14 | Stop reason: HOSPADM

## 2023-10-12 RX ORDER — CARBOPROST TROMETHAMINE 250 UG/ML
250 INJECTION, SOLUTION INTRAMUSCULAR PRN
Status: DISCONTINUED | OUTPATIENT
Start: 2023-10-12 | End: 2023-10-14 | Stop reason: HOSPADM

## 2023-10-12 RX ORDER — SODIUM CHLORIDE 0.9 % (FLUSH) 0.9 %
5-40 SYRINGE (ML) INJECTION EVERY 12 HOURS SCHEDULED
Status: DISCONTINUED | OUTPATIENT
Start: 2023-10-12 | End: 2023-10-14 | Stop reason: HOSPADM

## 2023-10-12 RX ORDER — METHYLERGONOVINE MALEATE 0.2 MG/ML
200 INJECTION INTRAVENOUS PRN
Status: DISCONTINUED | OUTPATIENT
Start: 2023-10-12 | End: 2023-10-14 | Stop reason: HOSPADM

## 2023-10-12 RX ADMIN — Medication 25 MCG: at 20:40

## 2023-10-12 RX ADMIN — SODIUM CHLORIDE, POTASSIUM CHLORIDE, SODIUM LACTATE AND CALCIUM CHLORIDE: 600; 310; 30; 20 INJECTION, SOLUTION INTRAVENOUS at 14:43

## 2023-10-12 RX ADMIN — Medication 25 MCG: at 14:21

## 2023-10-12 RX ADMIN — Medication 25 MCG: at 23:30

## 2023-10-12 RX ADMIN — Medication 25 MCG: at 17:26

## 2023-10-12 NOTE — FLOWSHEET NOTE
Postpartum hemorrhage risk reviewed with patient. RN informed patient that she is high risk. Patient declining insertion of 2nd IV at this time.

## 2023-10-12 NOTE — FLOWSHEET NOTE
Patient called RN to room. Upon assessment, patients IV was removed with blood covering patients arm. IV catheter intact and dressing applied to patients IV site.

## 2023-10-12 NOTE — H&P
Department of Obstetrics and Gynecology   History & Physical    Pt Name: Joyce Lan  MRN: [id number] [facility] #: [de-identified]  YOB: 1995  Estimated Date of Delivery: 10/24/23      HPI: The patient is a 34 y.o. N3Q0277 female  who being admitted for induction of labor due to gestational hypertension without severe features    Allergies:     Allergies as of 10/12/2023 - Fully Reviewed 10/12/2023   Allergen Reaction Noted    Shellfish allergy  11/06/2022    Shellfish-derived products Other (See Comments) 03/10/2023       Medications:    Current Facility-Administered Medications:     miSOPROStol (CYTOTEC) pre-split tablet TABS 25 mcg, 25 mcg, Vaginal, Q3H, Mai Bolus, DO, 25 mcg at 10/12/23 1421    lactated ringers IV soln infusion, , IntraVENous, Continuous, Mai Bolus, DO, Last Rate: 125 mL/hr at 10/12/23 1443, New Bag at 10/12/23 1443    lactated ringers bolus bolus 500 mL, 500 mL, IntraVENous, PRN **OR** lactated ringers bolus bolus 1,000 mL, 1,000 mL, IntraVENous, PRN, Javier Waddell,     sodium chloride flush 0.9 % injection 5-40 mL, 5-40 mL, IntraVENous, 2 times per day, Javier Waddell,     sodium chloride flush 0.9 % injection 5-40 mL, 5-40 mL, IntraVENous, PRN, Javier Waddell,     0.9 % sodium chloride infusion, , IntraVENous, PRN, Javier Waddell,     nalbuphine (NUBAIN) injection 5 mg, 5 mg, IntraMUSCular, Once PRN **AND** nalbuphine (NUBAIN) injection 5 mg, 5 mg, IntraVENous, Once PRN, Javier Waddell,     ondansetron [facility]) injection 4 mg, 4 mg, IntraVENous, Q6H PRN, Javier Waddell,     diphenhydrAMINE (BENADRYL) tablet 25 mg, 25 mg, Oral, Q4H PRN, Javier Waddell,     methylergonovine (METHERGINE) injection 200 mcg, 200 mcg, IntraMUSCular, PRN, Javier Waddell, DO    carboprost (HEMABATE) injection 250 mcg, 250 mcg, IntraMUSCular, PRN, Javier Waddell, DO    miSOPROStol (CYTOTEC) tablet 400 mcg, 400 mcg, Buccal, PRN, Mai Bolus,

## 2023-10-12 NOTE — FLOWSHEET NOTE
Call to Dr. Jamaal Harding for patient BP. Orders rec'd for [facility].   Electronically signed by Garrett Gomez RN on 10/12/2023 at 11:49 AM

## 2023-10-12 NOTE — FLOWSHEET NOTE
Updated Dr. Dos Santos Head on patient BP. Orders to admit patient for induction.   Electronically signed by Melva Leventhal, RN on 10/12/2023 at 1:36 PM

## 2023-10-13 LAB — RPR SER QL: NORMAL

## 2023-10-13 PROCEDURE — 2580000003 HC RX 258: Performed by: OBSTETRICS & GYNECOLOGY

## 2023-10-13 PROCEDURE — 3E0P7VZ INTRODUCTION OF HORMONE INTO FEMALE REPRODUCTIVE, VIA NATURAL OR ARTIFICIAL OPENING: ICD-10-PCS | Performed by: OBSTETRICS & GYNECOLOGY

## 2023-10-13 PROCEDURE — 10907ZC DRAINAGE OF AMNIOTIC FLUID, THERAPEUTIC FROM PRODUCTS OF CONCEPTION, VIA NATURAL OR ARTIFICIAL OPENING: ICD-10-PCS | Performed by: OBSTETRICS & GYNECOLOGY

## 2023-10-13 PROCEDURE — 6370000000 HC RX 637 (ALT 250 FOR IP): Performed by: OBSTETRICS & GYNECOLOGY

## 2023-10-13 PROCEDURE — 3700000025 EPIDURAL BLOCK: Performed by: NURSE ANESTHETIST, CERTIFIED REGISTERED

## 2023-10-13 PROCEDURE — 3E033VJ INTRODUCTION OF OTHER HORMONE INTO PERIPHERAL VEIN, PERCUTANEOUS APPROACH: ICD-10-PCS | Performed by: OBSTETRICS & GYNECOLOGY

## 2023-10-13 PROCEDURE — 2500000003 HC RX 250 WO HCPCS: Performed by: NURSE ANESTHETIST, CERTIFIED REGISTERED

## 2023-10-13 PROCEDURE — 2500000003 HC RX 250 WO HCPCS: Performed by: OBSTETRICS & GYNECOLOGY

## 2023-10-13 PROCEDURE — 1220000000 HC SEMI PRIVATE OB R&B

## 2023-10-13 PROCEDURE — 6360000002 HC RX W HCPCS: Performed by: OBSTETRICS & GYNECOLOGY

## 2023-10-13 RX ORDER — SIMETHICONE 80 MG
80 TABLET,CHEWABLE ORAL EVERY 6 HOURS PRN
Status: DISCONTINUED | OUTPATIENT
Start: 2023-10-13 | End: 2023-10-14 | Stop reason: HOSPADM

## 2023-10-13 RX ORDER — SODIUM CHLORIDE 9 MG/ML
INJECTION, SOLUTION INTRAVENOUS PRN
Status: DISCONTINUED | OUTPATIENT
Start: 2023-10-13 | End: 2023-10-14 | Stop reason: HOSPADM

## 2023-10-13 RX ORDER — NALOXONE HYDROCHLORIDE 0.4 MG/ML
INJECTION, SOLUTION INTRAMUSCULAR; INTRAVENOUS; SUBCUTANEOUS PRN
Status: DISCONTINUED | OUTPATIENT
Start: 2023-10-13 | End: 2023-10-14 | Stop reason: HOSPADM

## 2023-10-13 RX ORDER — DOCUSATE SODIUM 100 MG/1
100 CAPSULE, LIQUID FILLED ORAL 2 TIMES DAILY
Status: DISCONTINUED | OUTPATIENT
Start: 2023-10-13 | End: 2023-10-14 | Stop reason: HOSPADM

## 2023-10-13 RX ORDER — SODIUM CHLORIDE 0.9 % (FLUSH) 0.9 %
5-40 SYRINGE (ML) INJECTION PRN
Status: DISCONTINUED | OUTPATIENT
Start: 2023-10-13 | End: 2023-10-14 | Stop reason: HOSPADM

## 2023-10-13 RX ORDER — SODIUM CHLORIDE, SODIUM LACTATE, POTASSIUM CHLORIDE, CALCIUM CHLORIDE 600; 310; 30; 20 MG/100ML; MG/100ML; MG/100ML; MG/100ML
INJECTION, SOLUTION INTRAVENOUS CONTINUOUS
Status: DISCONTINUED | OUTPATIENT
Start: 2023-10-13 | End: 2023-10-14 | Stop reason: HOSPADM

## 2023-10-13 RX ORDER — MODIFIED LANOLIN
OINTMENT (GRAM) TOPICAL PRN
Status: DISCONTINUED | OUTPATIENT
Start: 2023-10-13 | End: 2023-10-14 | Stop reason: HOSPADM

## 2023-10-13 RX ORDER — ONDANSETRON 2 MG/ML
4 INJECTION INTRAMUSCULAR; INTRAVENOUS EVERY 6 HOURS PRN
Status: DISCONTINUED | OUTPATIENT
Start: 2023-10-13 | End: 2023-10-14 | Stop reason: HOSPADM

## 2023-10-13 RX ORDER — SODIUM CHLORIDE 0.9 % (FLUSH) 0.9 %
5-40 SYRINGE (ML) INJECTION EVERY 12 HOURS SCHEDULED
Status: DISCONTINUED | OUTPATIENT
Start: 2023-10-13 | End: 2023-10-14 | Stop reason: HOSPADM

## 2023-10-13 RX ORDER — FERROUS SULFATE 325(65) MG
325 TABLET ORAL 2 TIMES DAILY WITH MEALS
Status: DISCONTINUED | OUTPATIENT
Start: 2023-10-13 | End: 2023-10-14 | Stop reason: HOSPADM

## 2023-10-13 RX ADMIN — SODIUM CHLORIDE, POTASSIUM CHLORIDE, SODIUM LACTATE AND CALCIUM CHLORIDE: 600; 310; 30; 20 INJECTION, SOLUTION INTRAVENOUS at 12:46

## 2023-10-13 RX ADMIN — Medication 5 ML/HR: at 12:22

## 2023-10-13 RX ADMIN — SODIUM CHLORIDE, POTASSIUM CHLORIDE, SODIUM LACTATE AND CALCIUM CHLORIDE: 600; 310; 30; 20 INJECTION, SOLUTION INTRAVENOUS at 02:55

## 2023-10-13 RX ADMIN — SODIUM CHLORIDE, POTASSIUM CHLORIDE, SODIUM LACTATE AND CALCIUM CHLORIDE: 600; 310; 30; 20 INJECTION, SOLUTION INTRAVENOUS at 10:29

## 2023-10-13 RX ADMIN — Medication 25 MCG: at 02:54

## 2023-10-13 RX ADMIN — Medication 1 MILLI-UNITS/MIN: at 10:35

## 2023-10-13 RX ADMIN — ACETAMINOPHEN 650 MG: 325 TABLET ORAL at 22:21

## 2023-10-13 RX ADMIN — Medication 12 ML/HR: at 12:33

## 2023-10-13 RX ADMIN — DOCUSATE SODIUM 100 MG: 100 CAPSULE, LIQUID FILLED ORAL at 22:21

## 2023-10-13 RX ADMIN — SODIUM CHLORIDE, POTASSIUM CHLORIDE, SODIUM LACTATE AND CALCIUM CHLORIDE: 600; 310; 30; 20 INJECTION, SOLUTION INTRAVENOUS at 11:40

## 2023-10-13 RX ADMIN — Medication 25 MCG: at 06:14

## 2023-10-13 ASSESSMENT — PAIN DESCRIPTION - DESCRIPTORS: DESCRIPTORS: DISCOMFORT

## 2023-10-13 ASSESSMENT — PAIN SCALES - GENERAL: PAINLEVEL_OUTOF10: 3

## 2023-10-13 ASSESSMENT — PAIN DESCRIPTION - LOCATION: LOCATION: BACK

## 2023-10-13 ASSESSMENT — PAIN DESCRIPTION - ORIENTATION: ORIENTATION: LOWER

## 2023-10-13 ASSESSMENT — PAIN DESCRIPTION - PAIN TYPE: TYPE: ACUTE PAIN

## 2023-10-13 ASSESSMENT — PAIN DESCRIPTION - FREQUENCY: FREQUENCY: INTERMITTENT

## 2023-10-13 ASSESSMENT — PAIN - FUNCTIONAL ASSESSMENT: PAIN_FUNCTIONAL_ASSESSMENT: ACTIVITIES ARE NOT PREVENTED

## 2023-10-13 NOTE — FLOWSHEET NOTE
Pt called this nurse stating \"She is feeling a lot of pressure. \"   1564 MOHIT performed pt complete. [address] Elana Ledbetter a nurses station and informed of SVE and pt feeling pressure. [address] Elana Ledbetter bedside preparing for delivery at this time.

## 2023-10-13 NOTE — FLOWSHEET NOTE
Patient actively pushing. RN and Dr. Jessica Ya remains in continuous attendance at the bedside. Assessment & evaluation of fetal heart rate ongoing via continuous EFM' every 30 minutes.

## 2023-10-13 NOTE — FLOWSHEET NOTE
Dr. Stacy Carbajal phoned in stating that he will be signing out until Monday at [facility] and all patients will go to in house physician.

## 2023-10-13 NOTE — PLAN OF CARE
Problem: Pain  Goal: Verbalizes/displays adequate comfort level or baseline comfort level  10/13/2023 0157 by Gwen Dewey RN  Outcome: Progressing  10/12/2023 1454 by Rosalinda Rizo RN  Outcome: Progressing     Problem: Vaginal Birth or  Section  Goal: Fetal and maternal status remain reassuring during the birth process  Description:  Birth OB-Pregnancy care plan goal which identifies if the fetal and maternal status remain reassuring during the birth process  10/13/2023 0157 by Gwen Dewey RN  Outcome: Progressing  10/12/2023 1454 by Rosalinda Rizo RN  Outcome: Progressing     Problem: Infection - Adult  Goal: Absence of infection at discharge  10/13/2023 0157 by Gwen Dewey RN  Outcome: Progressing  10/12/2023 1454 by Rosalinda Rizo RN  Outcome: Progressing  Goal: Absence of infection during hospitalization  10/13/2023 0157 by Gwen Dewey RN  Outcome: Progressing  10/12/2023 1454 by Rosalinda Rizo RN  Outcome: Progressing  Goal: Absence of fever/infection during anticipated neutropenic period  10/13/2023 0157 by Gwen Dewey RN  Outcome: Progressing  10/12/2023 1454 by Roslainda Rizo RN  Outcome: Progressing     Problem: Safety - Adult  Goal: Free from fall injury  10/13/2023 0157 by Gwen Dewey RN  Outcome: Progressing  10/12/2023 1454 by Rosalinda Rizo RN  Outcome: Progressing     Problem: Discharge Planning  Goal: Discharge to home or other facility with appropriate resources  10/13/2023 0157 by Gwen Dewey RN  Outcome: Progressing  10/12/2023 1454 by Rosalinda Rizo RN  Outcome: Progressing     Problem: Chronic Conditions and Co-morbidities  Goal: Patient's chronic conditions and co-morbidity symptoms are monitored and maintained or improved  10/13/2023 0157 by Gwen Dewey RN  Outcome: Progressing  10/12/2023 1454 by Rosalinda Rizo RN  Outcome: Progressing

## 2023-10-13 NOTE — ANESTHESIA PROCEDURE NOTES
Epidural Block    Patient location during procedure: OB  Start time: 10/13/2023 12:03 PM  End time: 10/13/2023 12:20 PM  Reason for block: labor epidural  Staffing  Performed: resident/CRNA   Resident/CRNA: EVY De Guzman - CRNA  Performed by: EVY De Guzman - CRNA  Authorized by:  Brittany King, DO    Epidural  Patient position: sitting  Prep: ChloraPrep  Patient monitoring: cardiac monitor, continuous pulse ox and frequent blood pressure checks  Approach: midline  Location: L3-4  Injection technique: CARLENE air  Provider prep: mask and sterile gloves  Needle  Needle type: Tuohy   Needle gauge: 17 G  Needle length: 3.5 in  Needle insertion depth: 8 cm  Catheter type: side hole  Catheter size: 20 G  Catheter at skin depth: 15 cm  Test dose: negative  Kit: perifix CEA TRAY  Lot number: [id number]  Expiration date: 11/30/2024Catheter Secured: tape and tegaderm  Assessment  Sensory level: T8  Hemodynamics: stable  Attempts: 1  Outcomes: uncomplicated and patient tolerated procedure well  Additional Notes  Pt has adequate block, VSS, FHR stable  Preanesthetic Checklist  Completed: patient identified, IV checked, site marked, risks and benefits discussed, surgical/procedural consents, equipment checked, pre-op evaluation, timeout performed, anesthesia consent given, oxygen available, monitors applied/VS acknowledged, fire risk safety assessment completed and verbalized and blood product R/B/A discussed and consented

## 2023-10-13 NOTE — PROGRESS NOTES
Dr. Bandar Pinto at bedside with nursing attempting to troubleshoot FSE. Unsuccessful. New FSE inserted by [facility]. Functioning properly at this time. Patient tolerated well.

## 2023-10-13 NOTE — FLOWSHEET NOTE
When pt laid back down after epidural placement IUPC fell out. Dr Morelia Pearson called and notified. States will be down shortly to replace.

## 2023-10-13 NOTE — ANESTHESIA POSTPROCEDURE EVALUATION
Department of Anesthesiology  Postprocedure Note    Patient: Isaiah Sotelo  MRN: [id number]  YOB: 1995  Date of evaluation: 10/13/2023      Procedure Summary     Date: 10/13/23 Room / Location:     Anesthesia Start: 1203 Anesthesia Stop:     Procedure: Labor Analgesia Diagnosis:     Scheduled Providers:  Responsible Provider: EVY Perea CRNA    Anesthesia Type: Epidural ASA Status: 3          Anesthesia Type: Epidural    Jarrett Phase I: Jarrett Score: 10    Jarrett Phase II:        Anesthesia Post Evaluation    Patient location during evaluation: PACU  Patient participation: complete - patient participated  Level of consciousness: awake  Pain score: 0  Airway patency: patent  Nausea & Vomiting: no nausea and no vomiting  Complications: no  Cardiovascular status: hemodynamically stable  Respiratory status: spontaneous ventilation, nonlabored ventilation and acceptable  Hydration status: stable  Pain management: adequate

## 2023-10-13 NOTE — L&D DELIVERY NOTE
Earl Bryant [[id number]]      Labor Events     Labor: No  Cervical Ripening Date/Time:  10/12/23 14:21:00   Cervical Ripening Type: Misoprostol  Rupture Date/Time:  10/13/23 10:16:00   Rupture Type: AROM  Fluid Color: Clear  Fluid Odor: None              Labor Event Times      Labor onset date/time:        Dilation complete date/time:  10/13/23 16:08:00 EDT     Start pushing date/time:     Decision date/time (emergent ):            Delivery Details      Delivery Date: 10/13/23 Delivery Time: 16:31:00   Delivery Type: Vaginal, Spontaneous              [place] Presentation    Presentation: Vertex       Shoulder Dystocia    Shoulder Dystocia Present?: No                                                                                                           Assisted Delivery Details    Forceps Attempted?: No  Vacuum Extractor Attempted?: No                                                             Cord    Vessels: 3 Vessels  Complications: None  Delayed Cord Clamping?: Yes  Cord Clamped Date/Time: 10/13/2023 16:32:00  Cord Blood Disposition: Lab  Gases Sent?: No              Placenta           Lacerations           Blood Loss  Mother: Eben Martinez #[id number]     Start of Mother's Information      Delivery Blood Loss  10/13/23 0431 - 10/13/23 1658      None                 End of Mother's Information  Mother: Eben Martinez #[id number]                Delivery Providers    Delivering clinician: Vazquez Johnson MD     Provider Role     Obstetrician    Jacek Ferro RN Primary Nurse    Maci Romeo RN Primary [place] Nurse     NICU Nurse     Neonatologist     Anesthesiologist     Nurse Anesthetist     Nurse Practitioner     Midwife     Nursery Nurse    Virginie Alfaro RN Registered Nurse               Assessment    Living Status: Living  Delivery Location Comment: ROOM 317        Skin Color:   Heart Rate:   Reflex Irritability:   Muscle Tone:   Respiratory

## 2023-10-13 NOTE — FLOWSHEET NOTE
[phone] called informed of pt wanting epidural and 1L bolus already given  1155 CRNA bedside  1156 pt sitting   1222 test dose  1229 pt laying down  1230 bolus  Difficulty tracing infant all this time d/t pt sitting for epidural placement.

## 2023-10-13 NOTE — PLAN OF CARE
Problem: Pain  Goal: Verbalizes/displays adequate comfort level or baseline comfort level  10/13/2023 0941 by Jamil Louis RN  Outcome: Progressing  10/13/2023 0157 by Soila Pisano RN  Outcome: Progressing     Problem: Vaginal Birth or  Section  Goal: Fetal and maternal status remain reassuring during the birth process  Description:  Birth OB-Pregnancy care plan goal which identifies if the fetal and maternal status remain reassuring during the birth process  10/13/2023 0941 by Jamil Louis RN  Outcome: Progressing  10/13/2023 0157 by Soila Pisano RN  Outcome: Progressing     Problem: Infection - Adult  Goal: Absence of infection at discharge  10/13/2023 0941 by Jamil Louis RN  Outcome: Progressing  10/13/2023 0157 by Soila Pisano RN  Outcome: Progressing  Goal: Absence of infection during hospitalization  10/13/2023 0941 by Jamil Louis RN  Outcome: Progressing  10/13/2023 0157 by Soila Pisano RN  Outcome: Progressing  Goal: Absence of fever/infection during anticipated neutropenic period  10/13/2023 0941 by Jamil Louis RN  Outcome: Progressing  10/13/2023 0157 by Soila Pisano RN  Outcome: Progressing     Problem: Safety - Adult  Goal: Free from fall injury  10/13/2023 0941 by Jamil Louis RN  Outcome: Progressing  10/13/2023 0157 by Soila Pisano RN  Outcome: Progressing     Problem: Discharge Planning  Goal: Discharge to home or other facility with appropriate resources  10/13/2023 0941 by Jamil Louis RN  Outcome: Progressing  10/13/2023 0157 by Soila Pisano RN  Outcome: Progressing     Problem: Chronic Conditions and Co-morbidities  Goal: Patient's chronic conditions and co-morbidity symptoms are monitored and maintained or improved  10/13/2023 0941 by Jamil Louis RN  Outcome: Progressing  10/13/2023 0157 by Soila Pisano RN  Outcome: Progressing

## 2023-10-13 NOTE — FLOWSHEET NOTE
Patient stating that she is having back pain and would like to lay on her side. RN unable to trace contractions while patient in laying on side. Educated patient of this and patient states that she still wants to lay on her side.

## 2023-10-14 VITALS
RESPIRATION RATE: 16 BRPM | SYSTOLIC BLOOD PRESSURE: 131 MMHG | DIASTOLIC BLOOD PRESSURE: 61 MMHG | TEMPERATURE: 97.9 F | HEART RATE: 83 BPM | OXYGEN SATURATION: 98 %

## 2023-10-14 LAB — HGB BLD-MCNC: 12 G/DL (ref 12–16)

## 2023-10-14 PROCEDURE — 7200000001 HC VAGINAL DELIVERY

## 2023-10-14 PROCEDURE — 85018 HEMOGLOBIN: CPT

## 2023-10-14 PROCEDURE — 36415 COLL VENOUS BLD VENIPUNCTURE: CPT

## 2023-10-14 ASSESSMENT — PAIN SCALES - GENERAL: PAINLEVEL_OUTOF10: 1

## 2023-10-14 NOTE — PLAN OF CARE
Problem: Pain  Goal: Verbalizes/displays adequate comfort level or baseline comfort level  10/14/2023 0855 by Munira Cheek RN  Outcome: Progressing  10/13/2023 2204 by Gail Stanton RN  Outcome: Progressing     Problem: Vaginal Birth or  Section  Goal: Fetal and maternal status remain reassuring during the birth process  Description:  Birth OB-Pregnancy care plan goal which identifies if the fetal and maternal status remain reassuring during the birth process  10/13/2023 2204 by Gail Stanton RN  Outcome: Completed     Problem: Infection - Adult  Goal: Absence of infection at discharge  10/14/2023 0855 by Munira Cheek RN  Outcome: Progressing  10/13/2023 2204 by Gail Stanton RN  Outcome: Progressing  Goal: Absence of infection during hospitalization  10/14/2023 0855 by Munira Cheek RN  Outcome: Progressing  10/13/2023 2204 by Gail Stanton RN  Outcome: Progressing  Goal: Absence of fever/infection during anticipated neutropenic period  10/14/2023 0855 by Munira Cheek RN  Outcome: Progressing  10/13/2023 2204 by Gail Stanton RN  Outcome: Progressing     Problem: Safety - Adult  Goal: Free from fall injury  10/14/2023 0855 by Munira Cheek RN  Outcome: Progressing  10/13/2023 2204 by Gail Stanton RN  Outcome: Progressing     Problem: Discharge Planning  Goal: Discharge to home or other facility with appropriate resources  10/14/2023 0855 by Munira Cheek RN  Outcome: Progressing  10/13/2023 2204 by Gail Stanton RN  Outcome: Progressing     Problem: Chronic Conditions and Co-morbidities  Goal: Patient's chronic conditions and co-morbidity symptoms are monitored and maintained or improved  10/14/2023 0855 by Munria Cheek RN  Outcome: Progressing  10/13/2023 2204 by Gail Stanton RN  Outcome: Progressing

## 2023-10-14 NOTE — PLAN OF CARE
Problem: Pain  Goal: Verbalizes/displays adequate comfort level or baseline comfort level  10/13/2023 2204 by Almaz Zazueta RN  Outcome: Progressing  10/13/2023 0941 by Cassy Sanchez RN  Outcome: Progressing     Problem: Infection - Adult  Goal: Absence of infection at discharge  10/13/2023 2204 by Almaz Zazueta RN  Outcome: Progressing  10/13/2023 0941 by Cassy Sanchez RN  Outcome: Progressing  Goal: Absence of infection during hospitalization  10/13/2023 2204 by Almaz Zazueta RN  Outcome: Progressing  10/13/2023 0941 by Cassy Sanchez RN  Outcome: Progressing  Goal: Absence of fever/infection during anticipated neutropenic period  10/13/2023 2204 by Almaz Zazueta RN  Outcome: Progressing  10/13/2023 0941 by Cassy Sanchez RN  Outcome: Progressing     Problem: Safety - Adult  Goal: Free from fall injury  10/13/2023 2204 by Almaz Zazueta RN  Outcome: Progressing  10/13/2023 0941 by Cassy Sanchez RN  Outcome: Progressing     Problem: Discharge Planning  Goal: Discharge to home or other facility with appropriate resources  10/13/2023 2204 by Almaz Zazueta RN  Outcome: Progressing  10/13/2023 0941 by Cassy Sanchez RN  Outcome: Progressing     Problem: Chronic Conditions and Co-morbidities  Goal: Patient's chronic conditions and co-morbidity symptoms are monitored and maintained or improved  10/13/2023 2204 by Almaz Zazueta RN  Outcome: Progressing  10/13/2023 0941 by Cassy Sanchez RN  Outcome: Progressing

## 2023-10-14 NOTE — FLOWSHEET NOTE
Discharge guide to postpartum and  care booklet, Discharge instructions, and safe sleep information reviewed with patient. Questions answered. Pt verbalizes understanding. PB edu cuff and instructions provided.

## 2023-10-15 NOTE — DISCHARGE SUMMARY
Admission Date: 10/12/2023 10:35 AM  Discharge date: 10/14/2023    Condition at discharge: stable  Discharged: 16[address] SAlondra Easley had an uncomplicated Vaginal delivery  . Please refer to the delivery  note for more details      Her postpartum course was uncomplicated. She was discharged home on PPD #1 in a stable medical condition. Her vital signs remained stable postPartum  till the day of discharge. She went home  with a prescription for iron supplementation and pain medication    At the time of discharge her pain was well controlled, she was eating a normal diet with no nausea or vomiting and was voiding without difficulty. Her lochia was normal and she was ambulating well. Her vital signs were stable      Patient Discharge instructions:    1-Please follow up with your OB/GYN  for routine postpartum care in 6 week. 2-Please seek medical attention should you develop fever, chills, increasing abdominal pain, increasing vaginal bleeding, redness, swelling, or drainage from your incision, chest pain, shortness of breath, or a persistently low mood. 3-You have dissolvable sutures in your incision.  These (do not) need to be removed

## 2023-11-03 ENCOUNTER — OFFICE VISIT (OUTPATIENT)
Dept: OBGYN CLINIC | Age: 28
End: 2023-11-03

## 2023-11-03 VITALS
BODY MASS INDEX: 46.52 KG/M2 | HEART RATE: 78 BPM | WEIGHT: 293 LBS | DIASTOLIC BLOOD PRESSURE: 74 MMHG | SYSTOLIC BLOOD PRESSURE: 122 MMHG

## 2023-11-03 DIAGNOSIS — Z30.011 ENCOUNTER FOR INITIAL PRESCRIPTION OF CONTRACEPTIVE PILLS: ICD-10-CM

## 2023-11-03 DIAGNOSIS — N94.6 DYSMENORRHEA: ICD-10-CM

## 2023-11-03 PROBLEM — O99.210 OBESITY COMPLICATING PREGNANCY: Status: RESOLVED | Noted: 2023-05-26 | Resolved: 2023-11-03

## 2023-11-03 RX ORDER — CHLORHEXIDINE GLUCONATE ORAL RINSE 1.2 MG/ML
SOLUTION DENTAL
COMMUNITY
Start: 2023-10-23

## 2023-11-03 RX ORDER — DROSPIRENONE 4 MG/1
1 TABLET, FILM COATED ORAL DAILY
Qty: 30 TABLET | Refills: 2 | Status: SHIPPED | OUTPATIENT
Start: 2023-11-03 | End: 2024-02-01

## 2023-11-03 ASSESSMENT — ENCOUNTER SYMPTOMS
SHORTNESS OF BREATH: 0
CONSTIPATION: 0
NAUSEA: 0
VOMITING: 0
ABDOMINAL PAIN: 0
DIARRHEA: 0

## 2023-11-14 ENCOUNTER — TELEPHONE (OUTPATIENT)
Dept: PRIMARY CARE | Facility: CLINIC | Age: 28
End: 2023-11-14
Payer: COMMERCIAL

## 2023-11-22 PROBLEM — E55.9 VITAMIN D DEFICIENCY: Status: ACTIVE | Noted: 2023-11-22

## 2023-11-22 PROBLEM — E88.810 INSULIN RESISTANCE SYNDROME: Status: ACTIVE | Noted: 2023-11-22

## 2023-11-22 PROBLEM — F41.1 GENERALIZED ANXIETY DISORDER: Status: ACTIVE | Noted: 2023-11-22

## 2023-11-22 PROBLEM — D64.9 ANEMIA: Status: ACTIVE | Noted: 2023-11-22

## 2023-11-22 PROBLEM — F32.9 MAJOR DEPRESSIVE DISORDER: Status: ACTIVE | Noted: 2023-11-22

## 2023-11-22 PROBLEM — J45.909 UNCOMPLICATED ASTHMA (HHS-HCC): Status: ACTIVE | Noted: 2023-06-27

## 2023-12-05 ENCOUNTER — OFFICE VISIT (OUTPATIENT)
Dept: PRIMARY CARE | Facility: CLINIC | Age: 28
End: 2023-12-05
Payer: COMMERCIAL

## 2023-12-05 VITALS
SYSTOLIC BLOOD PRESSURE: 128 MMHG | DIASTOLIC BLOOD PRESSURE: 84 MMHG | RESPIRATION RATE: 18 BRPM | HEART RATE: 66 BPM | BODY MASS INDEX: 45.99 KG/M2 | WEIGHT: 293 LBS | TEMPERATURE: 97.3 F | HEIGHT: 67 IN | OXYGEN SATURATION: 99 %

## 2023-12-05 DIAGNOSIS — Z00.00 ANNUAL PHYSICAL EXAM: ICD-10-CM

## 2023-12-05 DIAGNOSIS — M25.562 CHRONIC PAIN OF BOTH KNEES: ICD-10-CM

## 2023-12-05 DIAGNOSIS — G56.02 CARPAL TUNNEL SYNDROME OF LEFT WRIST: ICD-10-CM

## 2023-12-05 DIAGNOSIS — E66.01 CLASS 3 SEVERE OBESITY DUE TO EXCESS CALORIES WITHOUT SERIOUS COMORBIDITY WITH BODY MASS INDEX (BMI) OF 45.0 TO 49.9 IN ADULT (MULTI): Primary | ICD-10-CM

## 2023-12-05 DIAGNOSIS — M25.561 CHRONIC PAIN OF BOTH KNEES: ICD-10-CM

## 2023-12-05 DIAGNOSIS — Z23 FLU VACCINE NEED: ICD-10-CM

## 2023-12-05 DIAGNOSIS — G89.29 CHRONIC PAIN OF BOTH KNEES: ICD-10-CM

## 2023-12-05 PROCEDURE — 90686 IIV4 VACC NO PRSV 0.5 ML IM: CPT | Performed by: PHYSICIAN ASSISTANT

## 2023-12-05 PROCEDURE — 3008F BODY MASS INDEX DOCD: CPT | Performed by: PHYSICIAN ASSISTANT

## 2023-12-05 PROCEDURE — 90471 IMMUNIZATION ADMIN: CPT | Performed by: PHYSICIAN ASSISTANT

## 2023-12-05 PROCEDURE — 1036F TOBACCO NON-USER: CPT | Performed by: PHYSICIAN ASSISTANT

## 2023-12-05 PROCEDURE — 99395 PREV VISIT EST AGE 18-39: CPT | Performed by: PHYSICIAN ASSISTANT

## 2023-12-05 NOTE — PROGRESS NOTES
"Subjective   Patient ID: Elina Roland is a 28 y.o. female who presents for Annual Exam (Pt here today for a check up; last seen 10/2022. Pt needs a ref to bariatric surgeon. Pt having bilateral knee pain; thinks there extra bone in left knee. ).    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   - hasn't had xrays of the knees yet   - Location:   - Associated with:  - Aggravated by:  - Txs tried:   - Previous injury/ surgery:     Left hand numbness  - She is left hand dominant and has been crafting for a long time   - Numbness radiates from wrist into thumb and index finger     Review of Systems   Eyes:  Negative for pain and visual disturbance.   Respiratory:  Negative for shortness of breath.    Cardiovascular:  Negative for chest pain.   Gastrointestinal:  Negative for abdominal pain, constipation, diarrhea, nausea and vomiting.   Musculoskeletal:  Negative for arthralgias and myalgias.   Skin:  Negative for rash.       Objective   /84   Pulse 66   Temp 36.3 °C (97.3 °F)   Resp 18   Ht 1.702 m (5' 7\")   Wt 137 kg (302 lb)   SpO2 99%   BMI 47.30 kg/m²     Physical Exam  Constitutional:       General: She is not in acute distress.     Appearance: She is obese.   HENT:      Head: Normocephalic.      Right Ear: Tympanic membrane and ear " canal normal.      Left Ear: Tympanic membrane and ear canal normal.      Nose: Nose normal.      Mouth/Throat:      Mouth: Mucous membranes are moist.      Pharynx: Oropharynx is clear.   Eyes:      Extraocular Movements: Extraocular movements intact.      Conjunctiva/sclera: Conjunctivae normal.      Pupils: Pupils are equal, round, and reactive to light.   Cardiovascular:      Rate and Rhythm: Normal rate and regular rhythm.      Pulses: Normal pulses.      Heart sounds: No murmur heard.  Pulmonary:      Effort: Pulmonary effort is normal.      Breath sounds: Normal breath sounds. No wheezing, rhonchi or rales.   Abdominal:      General: Bowel sounds are normal. There is no distension.      Palpations: Abdomen is soft. There is no mass.      Tenderness: There is no abdominal tenderness. There is no guarding.   Musculoskeletal:         General: Normal range of motion.      Cervical back: Neck supple.   Lymphadenopathy:      Cervical: No cervical adenopathy.   Skin:     General: Skin is warm and dry.      Findings: No lesion or rash.   Neurological:      General: No focal deficit present.      Mental Status: She is alert.      Gait: Gait normal.   Psychiatric:         Mood and Affect: Mood normal.         Assessment/Plan     Problem List Items Addressed This Visit       Class 3 severe obesity due to excess calories without serious comorbidity with body mass index (BMI) of 45.0 to 49.9 in adult (CMS/[facility]) - Primary    Current Assessment & Plan     - She did lose 20lbs with Trulicity but then got pregnant   - Recommend healthy lifestyle modifications - improve diet, add regular exercise  - Pt requests referral to bariatric team          Relevant Orders    Referral to Bariatric Surgery    Chronic pain of both knees    Current Assessment & Plan     - Will check xrays  - Likely PFPS - would benefit from weight loss and physical therapy          Relevant Orders    XR knee 3 views bilateral    Carpal tunnel syndrome of  left wrist    Current Assessment & Plan     - Educated the pt about the dx and tx options  - Recommend start with night bracing, rest, PO or topical NSAIDs, ice   - If still bothersome, refer to ortho          Annual physical exam    Overview     - PAP negative 3/10/23 through OB - next due 3/2026  - Tdap 4/21/21 - next due 4/2031  - COVID19 vax - Shital 12/2/21 - declines boosters           Current Assessment & Plan     PREVENTIVE CARE SCREENING:  - Labs/ Lipid screen: UTD   - PAP: UTD through GYN   - Tdap: UTD   - COVID-19 vax: DECLINED booster   - Mood is good  - Home life is good, lives in [place] with  and 3 children   - Stay at home mom   - Discussed DIET - suboptimal - reduce snacks, processed foods, sugary and greasy foods, fast foods. Increase healthy alternatives, whole grains, fruits vegetables.   - Discussed EXERCISE - Recommended weight training for bone health and 30 minutes of cardiovascular exercise 5-7 days a week.  - Encouraged pt to get yearly eye and dental exams  - Avoid cigarette smoking.   - Limit alcohol consumption.              Other Visit Diagnoses       Flu vaccine need        Relevant Orders    Flu vaccine (IIV4) age 6 months and greater, preservative free (Completed)

## 2023-12-06 PROBLEM — Z00.00 ANNUAL PHYSICAL EXAM: Status: ACTIVE | Noted: 2023-12-06

## 2023-12-06 PROBLEM — G56.02 CARPAL TUNNEL SYNDROME OF LEFT WRIST: Status: ACTIVE | Noted: 2023-12-06

## 2023-12-06 PROBLEM — M25.561 CHRONIC PAIN OF BOTH KNEES: Status: ACTIVE | Noted: 2023-12-06

## 2023-12-06 PROBLEM — M25.562 CHRONIC PAIN OF BOTH KNEES: Status: ACTIVE | Noted: 2023-12-06

## 2023-12-06 PROBLEM — E66.813 CLASS 3 SEVERE OBESITY DUE TO EXCESS CALORIES WITHOUT SERIOUS COMORBIDITY WITH BODY MASS INDEX (BMI) OF 45.0 TO 49.9 IN ADULT: Status: ACTIVE | Noted: 2023-12-06

## 2023-12-06 PROBLEM — E66.01 CLASS 3 SEVERE OBESITY DUE TO EXCESS CALORIES WITHOUT SERIOUS COMORBIDITY WITH BODY MASS INDEX (BMI) OF 45.0 TO 49.9 IN ADULT (MULTI): Status: ACTIVE | Noted: 2023-12-06

## 2023-12-06 PROBLEM — G89.29 CHRONIC PAIN OF BOTH KNEES: Status: ACTIVE | Noted: 2023-12-06

## 2023-12-06 ASSESSMENT — ENCOUNTER SYMPTOMS
CONSTIPATION: 0
NAUSEA: 0
VOMITING: 0
ABDOMINAL PAIN: 0
MYALGIAS: 0
ARTHRALGIAS: 0
EYE PAIN: 0
SHORTNESS OF BREATH: 0
DIARRHEA: 0

## 2023-12-06 NOTE — ASSESSMENT & PLAN NOTE
PREVENTIVE CARE SCREENING:  - Labs/ Lipid screen: UTD   - PAP: UTD through GYN   - Tdap: UTD   - COVID-19 vax: DECLINED booster   - Mood is good  - Home life is good, lives in [place] with  and 3 children   - Stay at home mom   - Discussed DIET - suboptimal - reduce snacks, processed foods, sugary and greasy foods, fast foods. Increase healthy alternatives, whole grains, fruits vegetables.   - Discussed EXERCISE - Recommended weight training for bone health and 30 minutes of cardiovascular exercise 5-7 days a week.  - Encouraged pt to get yearly eye and dental exams  - Avoid cigarette smoking.   - Limit alcohol consumption.

## 2023-12-06 NOTE — ASSESSMENT & PLAN NOTE
- She did lose 20lbs with Trulicity but then got pregnant   - Recommend healthy lifestyle modifications - improve diet, add regular exercise  - Pt requests referral to bariatric team

## 2023-12-06 NOTE — ASSESSMENT & PLAN NOTE
- Educated the pt about the dx and tx options  - Recommend start with night bracing, rest, PO or topical NSAIDs, ice   - If still bothersome, refer to ortho

## 2023-12-18 ENCOUNTER — TELEPHONE (OUTPATIENT)
Dept: SURGERY | Facility: CLINIC | Age: 28
End: 2023-12-18
Payer: COMMERCIAL

## 2023-12-18 NOTE — TELEPHONE ENCOUNTER
Attempted to reach patient for initial consult scheduling with Bariatric Surgery.  Left message.        Patient called back and stated she was interested in being scheduled through the [facility].  Mid way through the call, the call was disconnected.  Scheduled patient for 1/16/24 with Dr. Salinas and 1/18/24 with Kiana Tomlinson.  Left a voicemail for the patient reflecting the appointment times and also reminded her she will need to complete her [employer] registration to be able to complete these visits.  Unable to send [facility] class schedule as she does not have [employer] and does not have email consent for our department.

## 2023-12-21 ENCOUNTER — PREP FOR PROCEDURE (OUTPATIENT)
Dept: BARIATRICS/WEIGHT MGMT | Age: 28
End: 2023-12-21

## 2023-12-21 DIAGNOSIS — E66.01 MORBID OBESITY (HCC): ICD-10-CM

## 2023-12-26 PROBLEM — M25.561 CHRONIC PAIN OF BOTH KNEES: Status: ACTIVE | Noted: 2023-12-06

## 2023-12-26 PROBLEM — J45.909 UNCOMPLICATED ASTHMA: Status: ACTIVE | Noted: 2023-06-27

## 2023-12-26 PROBLEM — D64.9 ANEMIA: Status: ACTIVE | Noted: 2023-11-22

## 2023-12-26 PROBLEM — E55.9 VITAMIN D DEFICIENCY: Status: ACTIVE | Noted: 2023-11-22

## 2023-12-26 PROBLEM — G89.29 CHRONIC PAIN OF BOTH KNEES: Status: ACTIVE | Noted: 2023-12-06

## 2023-12-26 PROBLEM — F41.1 GENERALIZED ANXIETY DISORDER: Status: ACTIVE | Noted: 2023-11-22

## 2023-12-26 PROBLEM — M25.562 CHRONIC PAIN OF BOTH KNEES: Status: ACTIVE | Noted: 2023-12-06

## 2023-12-26 PROBLEM — E88.810 INSULIN RESISTANCE SYNDROME: Status: ACTIVE | Noted: 2023-11-22

## 2023-12-26 PROBLEM — G56.02 CARPAL TUNNEL SYNDROME OF LEFT WRIST: Status: ACTIVE | Noted: 2023-12-06

## 2023-12-26 PROBLEM — F32.9 MAJOR DEPRESSIVE DISORDER: Status: ACTIVE | Noted: 2023-11-22

## 2023-12-27 ENCOUNTER — PROCEDURE VISIT (OUTPATIENT)
Dept: OBGYN CLINIC | Age: 28
End: 2023-12-27
Payer: COMMERCIAL

## 2023-12-27 VITALS
BODY MASS INDEX: 47.3 KG/M2 | DIASTOLIC BLOOD PRESSURE: 70 MMHG | HEART RATE: 79 BPM | WEIGHT: 293 LBS | SYSTOLIC BLOOD PRESSURE: 114 MMHG

## 2023-12-27 DIAGNOSIS — Z30.430 ENCOUNTER FOR INTRAUTERINE DEVICE PLACEMENT: ICD-10-CM

## 2023-12-27 DIAGNOSIS — Z32.02 URINE PREGNANCY TEST NEGATIVE: ICD-10-CM

## 2023-12-27 DIAGNOSIS — N94.6 DYSMENORRHEA: Primary | ICD-10-CM

## 2023-12-27 PROCEDURE — 58300 INSERT INTRAUTERINE DEVICE: CPT | Performed by: ADVANCED PRACTICE MIDWIFE

## 2023-12-27 NOTE — PROGRESS NOTES
IUD Insertion Procedure Note    Pre-operative Diagnosis:  Dysmeorrhea    Post-operative Diagnosis:  Same    Indications:  Same    Procedure Details:   Urine pregnancy test was done and result was negative. The risks (including infection, bleeding, pain, and uterine perforation) and benefits of the procedure were explained to the patient and Written informed consent was obtained. A Time Out was performed with patient participation prior to beginning the procedure. Cervix cleansed with Betadine. Uterus sounded to 8 cm. Mirena IUD inserted without difficulty. Strings visible and trimmed. Patient tolerated procedure well. IUD Information:    Type:  Mirena  LOT Number:  [id number]  Expiration Date:  01/31/2026  NDC:  08717-224-09    Condition:  Stable    Complications:  None    Plan:  The patient was advised to call for any fever or for prolonged or severe pain or bleeding. She was advised to use OTC Ibuprofen or Tylenol as needed for mild to moderate pain. Follow up:  Return in about 4 weeks (around 1/24/2024) for IUD Follow-up, (virtual visit).     EVY Busby CNM

## 2024-01-08 DIAGNOSIS — E66.01 MORBID OBESITY (HCC): Primary | ICD-10-CM

## 2024-01-08 LAB
REASON FOR REJECTION: NORMAL
REJECTED TEST: NORMAL
TSH REFLEX: 0.59 UIU/ML (ref 0.44–3.86)
VITAMIN B-12: 889 PG/ML (ref 232–1245)

## 2024-01-12 LAB
ANABASINE UR-MCNC: <5 NG/ML
COTININE UR-MCNC: <15 NG/ML
NICOTINE UR-MCNC: <15 NG/ML
TRANS-3-OH-COTININE UR-MCNC: <50 NG/ML

## 2024-01-16 ENCOUNTER — APPOINTMENT (OUTPATIENT)
Dept: SURGERY | Facility: HOSPITAL | Age: 29
End: 2024-01-16
Payer: COMMERCIAL

## 2024-01-23 ENCOUNTER — OFFICE VISIT (OUTPATIENT)
Dept: BARIATRICS/WEIGHT MGMT | Age: 29
End: 2024-01-23
Payer: COMMERCIAL

## 2024-01-23 VITALS — HEIGHT: 67 IN | BODY MASS INDEX: 45.99 KG/M2 | WEIGHT: 293 LBS

## 2024-01-23 DIAGNOSIS — E66.01 MORBID OBESITY (HCC): ICD-10-CM

## 2024-01-23 DIAGNOSIS — E66.01 MORBID OBESITY (HCC): Primary | ICD-10-CM

## 2024-01-23 DIAGNOSIS — Z71.3 DIETARY COUNSELING AND SURVEILLANCE: ICD-10-CM

## 2024-01-23 PROCEDURE — 97802 MEDICAL NUTRITION INDIV IN: CPT | Performed by: DIETITIAN, REGISTERED

## 2024-01-23 PROCEDURE — G8428 CUR MEDS NOT DOCUMENT: HCPCS | Performed by: DIETITIAN, REGISTERED

## 2024-01-23 PROCEDURE — G8417 CALC BMI ABV UP PARAM F/U: HCPCS | Performed by: DIETITIAN, REGISTERED

## 2024-01-23 NOTE — PROGRESS NOTES
[facility]  Initial Nutrition Consultation    Patient is a 28 y.o. female seen for initial Medical Nutrition Therapy visit for  Juancho en Y gastric bypass.   Visit number:  1 out of 6    1/23/2024  Height:   Ht Readings from Last 1 Encounters:   12/20/23 1.702 m (5' 7\")     Weight:   Wt Readings from Last 1 Encounters:   12/27/23 (!) 137 kg (302 lb)     BMI:   BMI Readings from Last 1 Encounters:   12/27/23 47.30 kg/m²     EBW: 142 lbs  Preoperative weight loss goal: 30 lbs (10%)    Weight History:   Wt Readings from Last 3 Encounters:   12/27/23 (!) 137 kg (302 lb)   12/20/23 (!) 136.5 kg (301 lb)   11/03/23 134.7 kg (297 lb)       Patient's lowest adult weight was 190 lbs at age 21.      Patient's highest adult weight was 320 lbs at age 28.   Patient's triggers/known causes to her highest weight are poor diet.    Patient has participated in the self directed low calorie diet, exercise, and keto, also started at another bariatric program but didn't finish d/t move ,  weight loss programs   Patient has participated in meal replacement/liquid diets.    Patient has participated in weight loss medications.    Patient is not lactose intolerant.  Patient does not have Restorationism/cultural food concerns.  Patient does have food allergies, shellfish    Patient dines out to a sit down restaurant 0 times per month.  Patient has fast food or picks up carry out 1 times per month.     Grocery Shopping in household done by: patient  Cooking in household done by: patient    Patient states over the past month has omitted all soda (was drinking 3 cans/day) and incorporating 3 meals/day (was skipping breakfast and sometimes lunch.)    24 hour recall/food frequency chart:  Breakfast:  2 eggs with cheese  Snack:   none  Lunch: protein and vegetable  Snack:  none  Dinner: starch only  Snack:  when up with baby at 1AM may have greek yogurt and granola  Drinks throughout the day: > 8 cups water, 1 cup

## 2024-01-25 ENCOUNTER — OFFICE VISIT (OUTPATIENT)
Dept: PULMONOLOGY | Age: 29
End: 2024-01-25
Payer: COMMERCIAL

## 2024-01-25 VITALS
BODY MASS INDEX: 45.99 KG/M2 | SYSTOLIC BLOOD PRESSURE: 118 MMHG | WEIGHT: 293 LBS | DIASTOLIC BLOOD PRESSURE: 76 MMHG | HEART RATE: 78 BPM | OXYGEN SATURATION: 99 % | HEIGHT: 67 IN | TEMPERATURE: 97.6 F

## 2024-01-25 DIAGNOSIS — G47.33 OSA (OBSTRUCTIVE SLEEP APNEA): Primary | ICD-10-CM

## 2024-01-25 DIAGNOSIS — J45.20 MILD INTERMITTENT ASTHMA WITHOUT COMPLICATION: ICD-10-CM

## 2024-01-25 DIAGNOSIS — E66.9 OBESITY, UNSPECIFIED CLASSIFICATION, UNSPECIFIED OBESITY TYPE, UNSPECIFIED WHETHER SERIOUS COMORBIDITY PRESENT: ICD-10-CM

## 2024-01-25 PROCEDURE — G8417 CALC BMI ABV UP PARAM F/U: HCPCS | Performed by: INTERNAL MEDICINE

## 2024-01-25 PROCEDURE — G8484 FLU IMMUNIZE NO ADMIN: HCPCS | Performed by: INTERNAL MEDICINE

## 2024-01-25 PROCEDURE — 1036F TOBACCO NON-USER: CPT | Performed by: INTERNAL MEDICINE

## 2024-01-25 PROCEDURE — 99203 OFFICE O/P NEW LOW 30 MIN: CPT | Performed by: INTERNAL MEDICINE

## 2024-01-25 PROCEDURE — G8427 DOCREV CUR MEDS BY ELIG CLIN: HCPCS | Performed by: INTERNAL MEDICINE

## 2024-01-25 NOTE — PROGRESS NOTES
NEW PATIENT VISIT-PULMONARY/SLEEP    2024     REFERRING PHYSICIAN:  Ericka Ling PA     REASON FOR REFERRAL:  Bariatric clearance     HPI:     Ekaterina Heard is a 28 y.o. female who was referred to sleep and pulmonary clinic for evaluation.     She is in the process of going through weight loss surgery.    Sleep:   Patient snores, there is witnessed apnea. Patient wakes up gasping for air.  She is tired and sleepy during the day.  He does not take naps during the day.  Denies any accidents or near misses.  STOP-BANG .     Has a history of mild intermittent asthma.  Has not been hospitalized for it in the past.  Has been on rescue inhaler in the past when she had viral infection.       Past Medical History   Past Medical History:   Diagnosis Date    Anemia 2023    Anxiety and depression     Asthma 2005    Binge eating disorder 12/10/2021    Major depressive disorder 2023       Past Surgical History  No past surgical history on file.    Allergies  Allergies   Allergen Reactions    Shellfish Allergy        Medications  Current Outpatient Medications   Medication Sig Dispense Refill    Multiple Vitamins-Minerals (MULTIPLE VITAMINS/WOMENS PO) Take by mouth      topiramate (TOPAMAX) 50 MG tablet Take 1 tablet by mouth 2 times daily 60 tablet 3     No current facility-administered medications for this visit.       Social History  Social History     Tobacco Use    Smoking status: Former     Current packs/day: 0.00     Types: Cigarettes     Quit date:      Years since quittin.0     Passive exposure: Never    Smokeless tobacco: Never   Substance Use Topics    Alcohol use: Not Currently       Family History  Family History   Problem Relation Age of Onset    No Known Problems Paternal Grandfather     No Known Problems Paternal Grandmother     Pancreatic Cancer Maternal Grandmother     No Known Problems Maternal Grandfather     Diabetes Father     Hypertension Mother     Arthritis

## 2024-01-28 LAB — VIT B1 SERPL-MCNC: 8 NMOL/L (ref 4–15)

## 2024-02-20 ENCOUNTER — HOSPITAL ENCOUNTER (OUTPATIENT)
Dept: SLEEP CENTER | Age: 29
Discharge: HOME | End: 2024-02-22
Payer: COMMERCIAL

## 2024-02-20 PROCEDURE — 95806 SLEEP STUDY UNATT&RESP EFFT: CPT

## 2024-03-25 ENCOUNTER — PREP FOR PROCEDURE (OUTPATIENT)
Dept: BARIATRICS/WEIGHT MGMT | Age: 29
End: 2024-03-25

## 2024-03-25 RX ORDER — SODIUM CHLORIDE 0.9 % (FLUSH) 0.9 %
5-40 SYRINGE (ML) INJECTION PRN
Status: CANCELLED | OUTPATIENT
Start: 2024-03-25

## 2024-03-25 RX ORDER — SODIUM CHLORIDE 9 MG/ML
INJECTION, SOLUTION INTRAVENOUS PRN
Status: CANCELLED | OUTPATIENT
Start: 2024-03-25

## 2024-03-25 RX ORDER — SODIUM CHLORIDE 9 MG/ML
INJECTION, SOLUTION INTRAVENOUS CONTINUOUS
Status: CANCELLED | OUTPATIENT
Start: 2024-03-25

## 2024-03-25 RX ORDER — SODIUM CHLORIDE 0.9 % (FLUSH) 0.9 %
5-40 SYRINGE (ML) INJECTION EVERY 12 HOURS SCHEDULED
Status: CANCELLED | OUTPATIENT
Start: 2024-03-25

## 2024-03-26 ENCOUNTER — ANESTHESIA EVENT (OUTPATIENT)
Dept: ENDOSCOPY | Age: 29
End: 2024-03-26
Payer: COMMERCIAL

## 2024-03-27 ENCOUNTER — HOSPITAL ENCOUNTER (OUTPATIENT)
Age: 29
Setting detail: OUTPATIENT SURGERY
Discharge: HOME | End: 2024-03-27
Attending: SURGERY | Admitting: SURGERY
Payer: COMMERCIAL

## 2024-03-27 ENCOUNTER — ANESTHESIA (OUTPATIENT)
Dept: ENDOSCOPY | Age: 29
End: 2024-03-27
Payer: COMMERCIAL

## 2024-03-27 VITALS
SYSTOLIC BLOOD PRESSURE: 127 MMHG | TEMPERATURE: 97.3 F | BODY MASS INDEX: 45.99 KG/M2 | DIASTOLIC BLOOD PRESSURE: 63 MMHG | RESPIRATION RATE: 20 BRPM | WEIGHT: 293 LBS | OXYGEN SATURATION: 96 % | HEART RATE: 66 BPM | HEIGHT: 67 IN

## 2024-03-27 DIAGNOSIS — E66.01 MORBID OBESITY (HCC): ICD-10-CM

## 2024-03-27 LAB
HCG, URINE, POC: NEGATIVE
Lab: ABNORMAL
NEGATIVE QC PASS/FAIL: ABNORMAL
POSITIVE QC PASS/FAIL: ABNORMAL

## 2024-03-27 PROCEDURE — 3700000000 HC ANESTHESIA ATTENDED CARE: Performed by: SURGERY

## 2024-03-27 PROCEDURE — 88342 IMHCHEM/IMCYTCHM 1ST ANTB: CPT

## 2024-03-27 PROCEDURE — 2580000003 HC RX 258: Performed by: SURGERY

## 2024-03-27 PROCEDURE — 88305 TISSUE EXAM BY PATHOLOGIST: CPT

## 2024-03-27 PROCEDURE — 43239 EGD BIOPSY SINGLE/MULTIPLE: CPT | Performed by: SURGERY

## 2024-03-27 PROCEDURE — 2709999900 HC NON-CHARGEABLE SUPPLY: Performed by: SURGERY

## 2024-03-27 PROCEDURE — 3700000001 HC ADD 15 MINUTES (ANESTHESIA): Performed by: SURGERY

## 2024-03-27 PROCEDURE — 3609017100 HC EGD: Performed by: SURGERY

## 2024-03-27 PROCEDURE — 99024 POSTOP FOLLOW-UP VISIT: CPT | Performed by: SURGERY

## 2024-03-27 PROCEDURE — 6360000002 HC RX W HCPCS: Performed by: NURSE ANESTHETIST, CERTIFIED REGISTERED

## 2024-03-27 PROCEDURE — 7100000011 HC PHASE II RECOVERY - ADDTL 15 MIN: Performed by: SURGERY

## 2024-03-27 PROCEDURE — 6370000000 HC RX 637 (ALT 250 FOR IP): Performed by: SURGERY

## 2024-03-27 PROCEDURE — 7100000010 HC PHASE II RECOVERY - FIRST 15 MIN: Performed by: SURGERY

## 2024-03-27 PROCEDURE — 2500000003 HC RX 250 WO HCPCS: Performed by: NURSE ANESTHETIST, CERTIFIED REGISTERED

## 2024-03-27 RX ORDER — SODIUM CHLORIDE 0.9 % (FLUSH) 0.9 %
5-40 SYRINGE (ML) INJECTION PRN
Status: DISCONTINUED | OUTPATIENT
Start: 2024-03-27 | End: 2024-03-27 | Stop reason: HOSPADM

## 2024-03-27 RX ORDER — SODIUM CHLORIDE 9 MG/ML
INJECTION, SOLUTION INTRAVENOUS
Status: DISCONTINUED
Start: 2024-03-27 | End: 2024-03-27 | Stop reason: HOSPADM

## 2024-03-27 RX ORDER — LIDOCAINE HYDROCHLORIDE 20 MG/ML
INJECTION, SOLUTION EPIDURAL; INFILTRATION; INTRACAUDAL; PERINEURAL PRN
Status: DISCONTINUED | OUTPATIENT
Start: 2024-03-27 | End: 2024-03-27 | Stop reason: SDUPTHER

## 2024-03-27 RX ORDER — MAGNESIUM HYDROXIDE 1200 MG/15ML
LIQUID ORAL PRN
Status: DISCONTINUED | OUTPATIENT
Start: 2024-03-27 | End: 2024-03-27 | Stop reason: ALTCHOICE

## 2024-03-27 RX ORDER — SIMETHICONE 40MG/0.6ML
SUSPENSION, DROPS(FINAL DOSAGE FORM)(ML) ORAL PRN
Status: DISCONTINUED | OUTPATIENT
Start: 2024-03-27 | End: 2024-03-27 | Stop reason: ALTCHOICE

## 2024-03-27 RX ORDER — SODIUM CHLORIDE 9 MG/ML
INJECTION, SOLUTION INTRAVENOUS CONTINUOUS
Status: DISCONTINUED | OUTPATIENT
Start: 2024-03-27 | End: 2024-03-27 | Stop reason: HOSPADM

## 2024-03-27 RX ORDER — SODIUM CHLORIDE 0.9 % (FLUSH) 0.9 %
5-40 SYRINGE (ML) INJECTION EVERY 12 HOURS SCHEDULED
Status: DISCONTINUED | OUTPATIENT
Start: 2024-03-27 | End: 2024-03-27 | Stop reason: HOSPADM

## 2024-03-27 RX ORDER — SODIUM CHLORIDE 9 MG/ML
INJECTION, SOLUTION INTRAVENOUS PRN
Status: DISCONTINUED | OUTPATIENT
Start: 2024-03-27 | End: 2024-03-27 | Stop reason: HOSPADM

## 2024-03-27 RX ORDER — OMEPRAZOLE 20 MG/1
20 CAPSULE, DELAYED RELEASE ORAL
Qty: 30 CAPSULE | Refills: 5 | Status: SHIPPED | OUTPATIENT
Start: 2024-03-27

## 2024-03-27 RX ORDER — PROPOFOL 10 MG/ML
INJECTION, EMULSION INTRAVENOUS PRN
Status: DISCONTINUED | OUTPATIENT
Start: 2024-03-27 | End: 2024-03-27 | Stop reason: SDUPTHER

## 2024-03-27 RX ADMIN — PROPOFOL 150 MG: 10 INJECTION, EMULSION INTRAVENOUS at 07:50

## 2024-03-27 RX ADMIN — LIDOCAINE HYDROCHLORIDE 40 MG: 20 INJECTION, SOLUTION EPIDURAL; INFILTRATION; INTRACAUDAL; PERINEURAL at 07:50

## 2024-03-27 RX ADMIN — PROPOFOL 150 MG: 10 INJECTION, EMULSION INTRAVENOUS at 07:57

## 2024-03-27 RX ADMIN — SODIUM CHLORIDE: 9 INJECTION, SOLUTION INTRAVENOUS at 07:36

## 2024-03-27 ASSESSMENT — PAIN SCALES - GENERAL: PAINLEVEL_OUTOF10: 0

## 2024-03-27 ASSESSMENT — PAIN - FUNCTIONAL ASSESSMENT: PAIN_FUNCTIONAL_ASSESSMENT: 0-10

## 2024-03-27 NOTE — H&P
Surgery Consultation    Patient Name:  :  Hospital Day:   Ekaterina Heard 1995  LOS: 0 days      Date of Service: 3/27/2024    Subjective:      History of Present Illness:     Ekaterina Heard  is a 28 y.o. female referred by DARLENE Ling for morbid obesity.  Ekaterina Heard reports multiple episodes of weight loss and regain after multiple diet and exercise programs. There has not been an evaluation for sleep apnea.  No nicotine or alcohol use.  She started the pre-operative process for Bariatric surgery at [facility] earlier this year, and recently moved to [place].  She has some GERD sx.  She experienced some weight loss with Trulicity but weight loss did not continue.   programs.    Past Medical History:   Diagnosis Date    Anemia 2023    Anxiety and depression     Asthma 2005    Binge eating disorder 12/10/2021    Major depressive disorder 2023    History reviewed. No pertinent surgical history.     Family History   Problem Relation Age of Onset    Hypertension Mother     Arthritis Mother     Depression Mother     Diabetes Father     Depression Sister     Arthritis Sister     No Known Problems Brother     No Known Problems Brother     Pancreatic Cancer Maternal Grandmother     No Known Problems Maternal Grandfather     No Known Problems Paternal Grandmother     No Known Problems Paternal Grandfather     Colon Cancer Neg Hx     Social History     Tobacco Use    Smoking status: Former     Current packs/day: 0.00     Types: Cigarettes     Quit date:      Years since quittin.2     Passive exposure: Never    Smokeless tobacco: Never   Vaping Use    Vaping Use: Former    Quit date: 2019   Substance Use Topics    Alcohol use: Not Currently    Drug use: Never        Allergies: Shellfish allergy    Review of Systems  Review of Systems - History obtained from the patient  General ROS: negative for - fever, malaise, or weight

## 2024-03-27 NOTE — ANESTHESIA POSTPROCEDURE EVALUATION
Department of Anesthesiology  Postprocedure Note    Patient: Ekaterina Heard  MRN: [id number]  YOB: 1995  Date of evaluation: 3/27/2024    Procedure Summary       Date: 03/27/24 Room / Location: [facility] OR 03 / [facility]    Anesthesia Start: 0749 Anesthesia Stop: 0804    Procedure: ESOPHAGOGASTRODUODENOSCOPY WITH BIOPSIES Diagnosis:       Morbid obesity (HCC)      (Morbid obesity (HCC) [E66.01])    Surgeons: Juan Ivey MD Responsible Provider: Dante Cloud APRN - CRNA    Anesthesia Type: MAC ASA Status: 3            Anesthesia Type: No value filed.    Jarrett Phase I: Jarrett Score: 10    Jarrett Phase II:      Anesthesia Post Evaluation    Patient location during evaluation: bedside  Patient participation: complete - patient participated  Level of consciousness: awake  Airway patency: patent  Nausea & Vomiting: no nausea and no vomiting  Cardiovascular status: blood pressure returned to baseline  Respiratory status: acceptable  Hydration status: euvolemic  Pain management: adequate        No notable events documented.

## 2024-03-27 NOTE — ANESTHESIA PRE PROCEDURE
results found for: \"PREGTESTUR\", \"PREGSERUM\", \"HCG\", \"HCGQUANT\"     ABGs: No results found for: \"PHART\", \"PO2ART\", \"SJV7BTS\", \"VLM9TUM\", \"BEART\", \"Z4AXXHVR\"     Type & Screen (If Applicable):  No results found for: \"LABABO\", \"LABRH\"    Drug/Infectious Status (If Applicable):  Lab Results   Component Value Date/Time    HEPCAB NONREACTIVE 03/10/2023 01:10 PM       COVID-19 Screening (If Applicable):   Lab Results   Component Value Date/Time    COVID19 Not Detected 11/30/2022 03:48 PM           Anesthesia Evaluation  Patient summary reviewed and Nursing notes reviewed  Airway: Mallampati: III  TM distance: >3 FB   Neck ROM: full  Mouth opening: > = 3 FB   Dental:          Pulmonary:   (+)       decreased breath sounds    asthma:                            Cardiovascular:Negative CV ROS                      Neuro/Psych:   (+) depression/anxiety             GI/Hepatic/Renal:   (+) morbid obesity          Endo/Other: Negative Endo/Other ROS                    Abdominal:   (+) obese          Vascular:          Other Findings:             Anesthesia Plan      MAC     ASA 3       Induction: intravenous.      Anesthetic plan and risks discussed with patient.      Plan discussed with surgical team.                    EVY Harp - CRNA   3/27/2024

## 2024-04-11 ENCOUNTER — OFFICE VISIT (OUTPATIENT)
Dept: PULMONOLOGY | Age: 29
End: 2024-04-11
Payer: COMMERCIAL

## 2024-04-11 VITALS
SYSTOLIC BLOOD PRESSURE: 122 MMHG | HEIGHT: 67 IN | WEIGHT: 293 LBS | DIASTOLIC BLOOD PRESSURE: 74 MMHG | OXYGEN SATURATION: 99 % | TEMPERATURE: 97.6 F | HEART RATE: 87 BPM | BODY MASS INDEX: 45.99 KG/M2

## 2024-04-11 DIAGNOSIS — E66.9 OBESITY, UNSPECIFIED CLASSIFICATION, UNSPECIFIED OBESITY TYPE, UNSPECIFIED WHETHER SERIOUS COMORBIDITY PRESENT: Primary | ICD-10-CM

## 2024-04-11 DIAGNOSIS — G47.30 SLEEP DISORDER BREATHING: ICD-10-CM

## 2024-04-11 PROCEDURE — G8427 DOCREV CUR MEDS BY ELIG CLIN: HCPCS | Performed by: INTERNAL MEDICINE

## 2024-04-11 PROCEDURE — 99213 OFFICE O/P EST LOW 20 MIN: CPT | Performed by: INTERNAL MEDICINE

## 2024-04-11 PROCEDURE — 1036F TOBACCO NON-USER: CPT | Performed by: INTERNAL MEDICINE

## 2024-04-11 PROCEDURE — G8417 CALC BMI ABV UP PARAM F/U: HCPCS | Performed by: INTERNAL MEDICINE

## 2024-04-11 NOTE — PROGRESS NOTES
PATIENT VISIT-PULMONARY/SLEEP    4/11/2024     REFERRING PHYSICIAN:  Ericka Ling PA     REASON FOR REFERRAL:  Bariatric clearance     HPI:     Ekaterina Heard is a 29 y.o. female who was referred to sleep and pulmonary clinic for evaluation.     She is in the process of going through weight loss surgery.    Sleep:   Patient snores, there is witnessed apnea. Patient wakes up gasping for air.  She is tired and sleepy during the day.  He does not take naps during the day.  Denies any accidents or near misses.  STOP-BANG 4/8.     Has a history of mild intermittent asthma.  Has not been hospitalized for it in the past.  Has been on rescue inhaler in the past when she had viral infection.       4/11/24:    Had a sleep study that I reviewed personally interpreted the results independently.  There is no significant respiratory events.  There is no significant hypoxia.  There is no change in her symptoms.    Past Medical History   Past Medical History:   Diagnosis Date    Anemia 11/22/2023    Anxiety and depression 2023    Asthma 2005    Binge eating disorder 12/10/2021    Major depressive disorder 11/22/2023       Past Surgical History  Past Surgical History:   Procedure Laterality Date    UPPER GASTROINTESTINAL ENDOSCOPY N/A 3/27/2024    ESOPHAGOGASTRODUODENOSCOPY WITH BIOPSIES performed by Juan Ivey MD at [facility]       Allergies  Allergies   Allergen Reactions    Shellfish Allergy        Medications  Current Outpatient Medications   Medication Sig Dispense Refill    omeprazole (PRILOSEC) 20 MG delayed release capsule Take 1 capsule by mouth every morning (before breakfast) 30 capsule 5    Multiple Vitamins-Minerals (MULTIPLE VITAMINS/WOMENS PO) Take by mouth      topiramate (TOPAMAX) 50 MG tablet Take 1 tablet by mouth 2 times daily 60 tablet 3     No current facility-administered medications for this visit.       Social History  Social History     Tobacco Use    Smoking status:

## 2024-04-21 ENCOUNTER — HOSPITAL ENCOUNTER (EMERGENCY)
Age: 29
Discharge: HOME | End: 2024-04-21
Payer: COMMERCIAL

## 2024-04-21 VITALS
BODY MASS INDEX: 47.09 KG/M2 | HEIGHT: 66 IN | DIASTOLIC BLOOD PRESSURE: 78 MMHG | WEIGHT: 293 LBS | SYSTOLIC BLOOD PRESSURE: 127 MMHG | TEMPERATURE: 97.9 F | OXYGEN SATURATION: 100 % | RESPIRATION RATE: 16 BRPM | HEART RATE: 77 BPM

## 2024-04-21 DIAGNOSIS — F41.1 ANXIETY STATE: ICD-10-CM

## 2024-04-21 DIAGNOSIS — F12.90 MARIJUANA USE: Primary | ICD-10-CM

## 2024-04-21 DIAGNOSIS — R11.11 VOMITING WITHOUT NAUSEA, UNSPECIFIED VOMITING TYPE: ICD-10-CM

## 2024-04-21 LAB
AMPHET UR QL SCN: ABNORMAL
BARBITURATES UR QL SCN: ABNORMAL
BENZODIAZ UR QL SCN: ABNORMAL
CANNABINOIDS UR QL SCN: POSITIVE
COCAINE UR QL SCN: ABNORMAL
DRUG SCREEN COMMENT UR-IMP: ABNORMAL
FENTANYL SCREEN, URINE: ABNORMAL
HCG UR QL: NEGATIVE
METHADONE UR QL SCN: ABNORMAL
OPIATES UR QL SCN: ABNORMAL
OXYCODONE UR QL SCN: ABNORMAL
PCP UR QL SCN: ABNORMAL
PROPOXYPH UR QL SCN: ABNORMAL

## 2024-04-21 PROCEDURE — 99283 EMERGENCY DEPT VISIT LOW MDM: CPT

## 2024-04-21 PROCEDURE — 6370000000 HC RX 637 (ALT 250 FOR IP)

## 2024-04-21 PROCEDURE — 80307 DRUG TEST PRSMV CHEM ANLYZR: CPT

## 2024-04-21 PROCEDURE — 84703 CHORIONIC GONADOTROPIN ASSAY: CPT

## 2024-04-21 RX ORDER — LORAZEPAM 2 MG/ML
1 INJECTION INTRAMUSCULAR ONCE
Status: DISCONTINUED | OUTPATIENT
Start: 2024-04-21 | End: 2024-04-21

## 2024-04-21 RX ORDER — 0.9 % SODIUM CHLORIDE 0.9 %
1000 INTRAVENOUS SOLUTION INTRAVENOUS ONCE
Status: DISCONTINUED | OUTPATIENT
Start: 2024-04-21 | End: 2024-04-21

## 2024-04-21 RX ORDER — ONDANSETRON 4 MG/1
4 TABLET, ORALLY DISINTEGRATING ORAL ONCE
Status: COMPLETED | OUTPATIENT
Start: 2024-04-21 | End: 2024-04-21

## 2024-04-21 RX ORDER — LORAZEPAM 1 MG/1
0.5 TABLET ORAL ONCE
Status: COMPLETED | OUTPATIENT
Start: 2024-04-21 | End: 2024-04-21

## 2024-04-21 RX ORDER — ONDANSETRON 4 MG/1
4 TABLET, ORALLY DISINTEGRATING ORAL ONCE
Status: DISCONTINUED | OUTPATIENT
Start: 2024-04-21 | End: 2024-04-21

## 2024-04-21 RX ORDER — ONDANSETRON 2 MG/ML
4 INJECTION INTRAMUSCULAR; INTRAVENOUS ONCE
Status: DISCONTINUED | OUTPATIENT
Start: 2024-04-21 | End: 2024-04-21

## 2024-04-21 RX ADMIN — ONDANSETRON 4 MG: 4 TABLET, ORALLY DISINTEGRATING ORAL at 01:59

## 2024-04-21 RX ADMIN — LORAZEPAM 0.5 MG: 1 TABLET ORAL at 01:51

## 2024-04-21 ASSESSMENT — PAIN - FUNCTIONAL ASSESSMENT
PAIN_FUNCTIONAL_ASSESSMENT: NONE - DENIES PAIN

## 2024-04-21 ASSESSMENT — LIFESTYLE VARIABLES
HOW OFTEN DO YOU HAVE A DRINK CONTAINING ALCOHOL: 2-4 TIMES A MONTH
HOW MANY STANDARD DRINKS CONTAINING ALCOHOL DO YOU HAVE ON A TYPICAL DAY: 1 OR 2

## 2024-04-21 NOTE — ED PROVIDER NOTES
systems was reviewed and negative.       PAST MEDICAL HISTORY     Past Medical History:   Diagnosis Date    Anemia 2023    Anxiety and depression     Asthma 2005    Binge eating disorder 12/10/2021    Major depressive disorder 2023         SURGICAL HISTORY       Past Surgical History:   Procedure Laterality Date    UPPER GASTROINTESTINAL ENDOSCOPY N/A 3/27/2024    ESOPHAGOGASTRODUODENOSCOPY WITH BIOPSIES performed by Juan Ivey MD at [facility]         CURRENT MEDICATIONS       Discharge Medication List as of 2024  2:52 AM        CONTINUE these medications which have NOT CHANGED    Details   omeprazole (PRILOSEC) 20 MG delayed release capsule Take 1 capsule by mouth every morning (before breakfast), Disp-30 capsule, R-5Normal      Multiple Vitamins-Minerals (MULTIPLE VITAMINS/WOMENS PO) Take by mouthHistorical Med      topiramate (TOPAMAX) 50 MG tablet Take 1 tablet by mouth 2 times daily, Disp-60 tablet, R-3Normal             ALLERGIES     Shellfish allergy    FAMILY HISTORY       Family History   Problem Relation Age of Onset    Hypertension Mother     Arthritis Mother     Depression Mother     Diabetes Father     Depression Sister     Arthritis Sister     No Known Problems Brother     No Known Problems Brother     Pancreatic Cancer Maternal Grandmother     No Known Problems Maternal Grandfather     No Known Problems Paternal Grandmother     No Known Problems Paternal Grandfather     Colon Cancer Neg Hx           SOCIAL HISTORY       Social History     Socioeconomic History    Marital status: Single   Tobacco Use    Smoking status: Former     Current packs/day: 0.00     Types: Cigarettes     Quit date:      Years since quittin.3     Passive exposure: Never    Smokeless tobacco: Never   Vaping Use    Vaping Use: Former    Quit date: 2019   Substance and Sexual Activity    Alcohol use: Not Currently    Drug use: Never    Sexual activity: Yes     Partners: Male

## 2024-04-21 NOTE — DISCHARGE INSTRUCTIONS
For symptoms are likely a side effect of taking a THC gummy.  I recommend you do not take these anymore.  Schedule a follow-up appointment with your PCP as discussed.  Return to the emergency department for any new or worsening symptoms.

## 2024-04-21 NOTE — ED TRIAGE NOTES
Pt states that she took half a gummy that she bought from a gas station. Pt complains that she \"isn't feeling well.\" Pt complains of N/V and feeling jittery.     Pt is primarily Yakut speaking.

## 2024-04-24 PROBLEM — G47.30 SLEEP-DISORDERED BREATHING: Status: ACTIVE | Noted: 2024-04-24

## 2024-05-03 ENCOUNTER — OFFICE VISIT (OUTPATIENT)
Dept: BARIATRICS/WEIGHT MGMT | Age: 29
End: 2024-05-03

## 2024-05-03 VITALS — WEIGHT: 293 LBS | BODY MASS INDEX: 45.99 KG/M2 | HEIGHT: 67 IN

## 2024-05-03 DIAGNOSIS — E66.01 MORBID OBESITY (HCC): Primary | ICD-10-CM

## 2024-05-03 DIAGNOSIS — Z71.3 DIETARY COUNSELING AND SURVEILLANCE: ICD-10-CM

## 2024-05-03 NOTE — PROGRESS NOTES
Nutrition follow up prior to surgery  Visit number:  2 out of 6 for Juancho en Y gastric bypass.    Initial Weight: 301 lbs    Wt Readings from Last 3 Encounters:   04/21/24 135 kg (297 lb 9.9 oz)   04/11/24 (!) 136.5 kg (301 lb)   03/27/24 134.7 kg (297 lb)     lost 3 lbs over 4 months.    Previous nutrition goals:   Include 4 meal times/day with protein at each  Include evening snack of protein and carbohydrate instead of 1AM snack  Sample protein shakes  Add Calcium citrate in divided doses  Begin regular exercise including cardio and strength training  Read \"Patient Guide to Bariatric Surgery\" before next visit  Keep food journal on [employer] cleve       Nutrition goals: partially met, states has been a stressful few months, moved several times and started a new job. Patient didn't keep food journal but reports eating 3-4 meal/day, mostly lean proteins, vegetables, fruit, trying to limit intake of rice.  Patient has reduced coffee from 1 cup to 1/2 cup/day.  Patient has purchased bariatric MVI, reviewed calcium recommendations. She is going to the gym 3 days/week mostly cardio.    PES Statement:  Overweight/Obesity related to a complex combination of decreased energy needs, disordered eating patterns, physical inactivity, and increased psychological/life stress as evidenced by BMI greater than 30 and inability to maintain a significant amount of weight loss through conventional weight loss interventions.    Intervention:  Reviewed previous goals and set new goals.  Stressed importance of preoperative weight loss or surgery may be postponed or cancelled.  Provided continued education regarding diet and lifestyle changes for optimal success post bariatric surgery.  Encouragement and support provided.    Education materials provided:   none    Plan/Recommendations:   Practice rule of 20  Sip liquids and practice  liquids from solids  Add Calcium citrate 500-600mg twice daily  Add strength training 2

## 2024-06-25 ENCOUNTER — OFFICE VISIT (OUTPATIENT)
Dept: BARIATRICS/WEIGHT MGMT | Age: 29
End: 2024-06-25
Payer: COMMERCIAL

## 2024-06-25 VITALS — BODY MASS INDEX: 45.99 KG/M2 | HEIGHT: 67 IN | WEIGHT: 293 LBS

## 2024-06-25 DIAGNOSIS — Z01.818 PRE-OP EVALUATION: ICD-10-CM

## 2024-06-25 DIAGNOSIS — Z71.89 ENCOUNTER FOR PSYCHOLOGICAL ASSESSMENT PRIOR TO BARIATRIC SURGERY: ICD-10-CM

## 2024-06-25 DIAGNOSIS — Z71.89 ENCOUNTER FOR PSYCHOLOGICAL ASSESSMENT PRIOR TO BARIATRIC SURGERY: Primary | ICD-10-CM

## 2024-06-25 DIAGNOSIS — G89.29 CHRONIC PAIN OF BOTH KNEES: ICD-10-CM

## 2024-06-25 DIAGNOSIS — E66.01 MORBID OBESITY (HCC): Primary | ICD-10-CM

## 2024-06-25 DIAGNOSIS — F12.90 MARIJUANA USE: ICD-10-CM

## 2024-06-25 DIAGNOSIS — E66.01 PSYCHOLOGICAL FACTORS AFFECTING MORBID OBESITY (HCC): ICD-10-CM

## 2024-06-25 DIAGNOSIS — Z71.3 DIETARY COUNSELING AND SURVEILLANCE: ICD-10-CM

## 2024-06-25 DIAGNOSIS — M25.561 CHRONIC PAIN OF BOTH KNEES: ICD-10-CM

## 2024-06-25 DIAGNOSIS — F54 PSYCHOLOGICAL FACTORS AFFECTING MORBID OBESITY (HCC): ICD-10-CM

## 2024-06-25 DIAGNOSIS — M25.562 CHRONIC PAIN OF BOTH KNEES: ICD-10-CM

## 2024-06-25 DIAGNOSIS — E66.01 CLASS 3 SEVERE OBESITY DUE TO EXCESS CALORIES WITHOUT SERIOUS COMORBIDITY WITH BODY MASS INDEX (BMI) OF 45.0 TO 49.9 IN ADULT (HCC): ICD-10-CM

## 2024-06-25 PROCEDURE — G8417 CALC BMI ABV UP PARAM F/U: HCPCS | Performed by: DIETITIAN, REGISTERED

## 2024-06-25 PROCEDURE — G8428 CUR MEDS NOT DOCUMENT: HCPCS | Performed by: DIETITIAN, REGISTERED

## 2024-06-25 PROCEDURE — 90791 PSYCH DIAGNOSTIC EVALUATION: CPT | Performed by: PSYCHOLOGIST

## 2024-06-25 PROCEDURE — 1036F TOBACCO NON-USER: CPT | Performed by: PSYCHOLOGIST

## 2024-06-25 PROCEDURE — 97803 MED NUTRITION INDIV SUBSEQ: CPT | Performed by: DIETITIAN, REGISTERED

## 2024-06-25 ASSESSMENT — ANXIETY QUESTIONNAIRES
IF YOU CHECKED OFF ANY PROBLEMS ON THIS QUESTIONNAIRE, HOW DIFFICULT HAVE THESE PROBLEMS MADE IT FOR YOU TO DO YOUR WORK, TAKE CARE OF THINGS AT HOME, OR GET ALONG WITH OTHER PEOPLE: NOT DIFFICULT AT ALL
1. FEELING NERVOUS, ANXIOUS, OR ON EDGE: NOT AT ALL
GAD7 TOTAL SCORE: 0
7. FEELING AFRAID AS IF SOMETHING AWFUL MIGHT HAPPEN: NOT AT ALL
5. BEING SO RESTLESS THAT IT IS HARD TO SIT STILL: NOT AT ALL
6. BECOMING EASILY ANNOYED OR IRRITABLE: NOT AT ALL
3. WORRYING TOO MUCH ABOUT DIFFERENT THINGS: NOT AT ALL
2. NOT BEING ABLE TO STOP OR CONTROL WORRYING: NOT AT ALL
4. TROUBLE RELAXING: NOT AT ALL

## 2024-06-25 ASSESSMENT — PATIENT HEALTH QUESTIONNAIRE - PHQ9
8. MOVING OR SPEAKING SO SLOWLY THAT OTHER PEOPLE COULD HAVE NOTICED. OR THE OPPOSITE, BEING SO FIGETY OR RESTLESS THAT YOU HAVE BEEN MOVING AROUND A LOT MORE THAN USUAL: NOT AT ALL
9. THOUGHTS THAT YOU WOULD BE BETTER OFF DEAD, OR OF HURTING YOURSELF: NOT AT ALL
2. FEELING DOWN, DEPRESSED OR HOPELESS: NOT AT ALL
SUM OF ALL RESPONSES TO PHQ QUESTIONS 1-9: 0
SUM OF ALL RESPONSES TO PHQ9 QUESTIONS 1 & 2: 0
7. TROUBLE CONCENTRATING ON THINGS, SUCH AS READING THE NEWSPAPER OR WATCHING TELEVISION: NOT AT ALL
1. LITTLE INTEREST OR PLEASURE IN DOING THINGS: NOT AT ALL
SUM OF ALL RESPONSES TO PHQ QUESTIONS 1-9: 0
6. FEELING BAD ABOUT YOURSELF - OR THAT YOU ARE A FAILURE OR HAVE LET YOURSELF OR YOUR FAMILY DOWN: NOT AT ALL
3. TROUBLE FALLING OR STAYING ASLEEP: NOT AT ALL
10. IF YOU CHECKED OFF ANY PROBLEMS, HOW DIFFICULT HAVE THESE PROBLEMS MADE IT FOR YOU TO DO YOUR WORK, TAKE CARE OF THINGS AT HOME, OR GET ALONG WITH OTHER PEOPLE: NOT DIFFICULT AT ALL
4. FEELING TIRED OR HAVING LITTLE ENERGY: NOT AT ALL
SUM OF ALL RESPONSES TO PHQ QUESTIONS 1-9: 0
5. POOR APPETITE OR OVEREATING: NOT AT ALL
SUM OF ALL RESPONSES TO PHQ QUESTIONS 1-9: 0

## 2024-06-25 NOTE — PROGRESS NOTES
Nutrition follow up prior to surgery  Visit number:  3 out of 6 for Juancho en Y gastric bypass.    Initial Weight: 301 lbs    Wt Readings from Last 3 Encounters:   06/25/24 135.9 kg (299 lb 9.6 oz)   05/03/24 135.4 kg (298 lb 6.4 oz)   04/21/24 135 kg (297 lb 9.9 oz)     gained 1 lbs over 1 month, down 2 lbs from initial.    Previous nutrition goals:   Practice rule of 20  Sip liquids and practice  liquids from solids  Add Calcium citrate 500-600mg twice daily  Add strength training 2 days/week  Keep food journal on [employer] cleve    Nutrition goals: met, food journal shows 3 meals, 2 snacks, including occasional protein shakes. Patient has started at the gym 5 days/week, walking 15 minutes and strength training.  Taking bariatric pal MVI, hasn't gotten calcium citrate yet.  Patient hasn't tried CL protein toribio.    PES Statement:  Overweight/Obesity related to a complex combination of decreased energy needs, disordered eating patterns, physical inactivity, and increased psychological/life stress as evidenced by BMI greater than 30 and inability to maintain a significant amount of weight loss through conventional weight loss interventions.    Intervention:  Reviewed previous goals and set new goals.  Stressed importance of preoperative weight loss or surgery may be postponed or cancelled.  Provided continued education regarding diet and lifestyle changes for optimal success post bariatric surgery.  Encouragement and support provided.    Education materials provided:   none    Plan/Recommendations:   Continue current intake  Sample CL protein toribio  Add calcium citrate 500-600mg twice daily    Follow up: 4 weeks     Total time involved in direct patient education: 15 minutes    Rut Tobar RD

## 2024-06-25 NOTE — PROGRESS NOTES
Bariatric Presurgical Behavioral Health Assessment  Gavin Sumner Psy.D., [facility]  Licensed Clinical Psychologist ([id number])        Date of Evaluation: 6/25/2024    Date of Report: 6/25/2024    Name: Ekaterina Heard    Date of Birth 1995    Patient provided informed consent for the behavioral health evaluation. Discussed with patient the limits of confidentiality (i.e., abuse reporting, suicide intervention, review by treatment team, review by insurance company, etc.) and purpose of the evaluation. Patient indicated understanding.    Purpose of Assessment: Ekaterina is a 29 y.o. female, who was seen for a pre-surgical psychological evaluation. Ekaterina weighs 299.6 pounds and BMI is 46.92. The patient is planning to have the Juancho-en-Y Gastric Bypass procedure.    Evaluation Procedure:  · Clinical diagnostic interview, Face to Face, and mental status exam  · Alcohol Use Disorders Identification Test (AUDIT-10)  · Patient Health Questionnaire -9 (PHQ-9)  · Generalized Anxiety Disorder - 7 (ANIYA-7)  · Binge Eating Scale (BES)  · Brief Resiliency Scale (BRS)  · Mood Disorder Questionnaire (MDQ)  · Review of patient provided report of psychosocial history, medical history; and other available background information and medical records.  · Length of visit: 60 minutes      EVALUATION DETAILS:    Patient Active Problem List   Diagnosis    Shoulder dystocia, delivered    Normal labor and delivery    Morbid obesity (HCC)    Anemia    Carpal tunnel syndrome of left wrist    Chronic pain of both knees    Generalized anxiety disorder    Insulin resistance syndrome    Major depressive disorder    Vitamin D deficiency    Uncomplicated asthma    Sleep-disordered breathing         Current Outpatient Medications on File Prior to Visit   Medication Sig Dispense Refill    omeprazole (PRILOSEC) 20 MG delayed release capsule Take 1 capsule by mouth every morning (before breakfast) 30 capsule 5    Multiple Vitamins-Minerals

## 2024-06-26 LAB
AMPHET CTO UR CFM-MCNC: NEGATIVE NG/ML
BARBITURATES CTO UR CFM-MCNC: NEGATIVE NG/ML
BENZODIAZ CTO UR CFM-MCNC: NEGATIVE NG/ML
CANNABINOIDS CTO UR CFM-MCNC: NEGATIVE NG/ML
COCAINE CTO UR CFM-MCNC: NEGATIVE NG/ML
CREAT UR-MCNC: 60 MG/DL (ref 20–400)
DRUGS OF ABUSE COMMENT: NORMAL
METHADONE CTO UR CFM-MCNC: NEGATIVE NG/ML
OPIATES CTO UR CFM-MCNC: NEGATIVE NG/ML
PCP CTO UR CFM-MCNC: NEGATIVE NG/ML
PROPOXYPH CTO UR CFM-MCNC: NEGATIVE NG/ML

## 2024-06-27 DIAGNOSIS — Z01.818 PRE-OP EVALUATION: Primary | ICD-10-CM

## 2024-06-27 DIAGNOSIS — F12.90 MARIJUANA USE: ICD-10-CM

## 2024-07-30 ENCOUNTER — OFFICE VISIT (OUTPATIENT)
Dept: BARIATRICS/WEIGHT MGMT | Age: 29
End: 2024-07-30
Payer: COMMERCIAL

## 2024-07-30 VITALS — HEIGHT: 67 IN | WEIGHT: 293 LBS | BODY MASS INDEX: 45.99 KG/M2

## 2024-07-30 DIAGNOSIS — Z71.3 DIETARY COUNSELING AND SURVEILLANCE: ICD-10-CM

## 2024-07-30 DIAGNOSIS — Z01.818 PRE-OP EVALUATION: ICD-10-CM

## 2024-07-30 DIAGNOSIS — E66.01 MORBID OBESITY (HCC): Primary | ICD-10-CM

## 2024-07-30 DIAGNOSIS — F12.90 MARIJUANA USE: ICD-10-CM

## 2024-07-30 PROCEDURE — 97803 MED NUTRITION INDIV SUBSEQ: CPT | Performed by: DIETITIAN, REGISTERED

## 2024-07-30 PROCEDURE — G8417 CALC BMI ABV UP PARAM F/U: HCPCS | Performed by: DIETITIAN, REGISTERED

## 2024-07-30 PROCEDURE — G8428 CUR MEDS NOT DOCUMENT: HCPCS | Performed by: DIETITIAN, REGISTERED

## 2024-07-30 NOTE — PROGRESS NOTES
Nutrition follow up prior to surgery  Visit number:  4 out of 6 for Juancho en Y gastric bypass.    Initial Weight: 301 lbs    Wt Readings from Last 3 Encounters:   06/25/24 135.9 kg (299 lb 9.6 oz)   05/03/24 135.4 kg (298 lb 6.4 oz)   04/21/24 135 kg (297 lb 9.9 oz)     gained 3 lbs over 1 month, up 2 lbs from initial.    Previous nutrition goals:   Continue current intake  Sample CL protein toribio  Add calcium citrate 500-600mg twice daily    Nutrition goals: partially met, patient states has been consuming larger portions of pasta and rice this past month and adding bread to breakfast.  Patient continues to walk 15 minutes most days and going to the gym 3 times/week for strength training. Continues to take bariatric MVI, has not purchased calcium.    PES Statement:  Overweight/Obesity related to a complex combination of decreased energy needs, disordered eating patterns, physical inactivity, and increased psychological/life stress as evidenced by BMI greater than 30 and inability to maintain a significant amount of weight loss through conventional weight loss interventions.    Intervention:  Reviewed previous goals and set new goals.  Stressed importance of preoperative weight loss or surgery may be postponed or cancelled.  Provided continued education regarding diet and lifestyle changes for optimal success post bariatric surgery.  Encouragement and support provided.    Education materials provided:   none    Plan/Recommendations:   Limit starch to 1 serving ( 1/2 cup/meal or 1 slice bread)  Begin calcium citrate 600mg twice daily  Increase walking to 20-30 minutes and increase resistance of weights    Follow up: 4 weeks     Total time involved in direct patient education: 30 minutes    Rut Tobar RD

## 2024-07-31 LAB
AMPHET CTO UR CFM-MCNC: NEGATIVE NG/ML
BARBITURATES CTO UR CFM-MCNC: NEGATIVE NG/ML
BENZODIAZ CTO UR CFM-MCNC: NEGATIVE NG/ML
CANNABINOIDS CTO UR CFM-MCNC: NEGATIVE NG/ML
COCAINE CTO UR CFM-MCNC: NEGATIVE NG/ML
CREAT UR-MCNC: 111.5 MG/DL (ref 20–400)
DRUGS OF ABUSE COMMENT: NORMAL
METHADONE CTO UR CFM-MCNC: NEGATIVE NG/ML
OPIATES CTO UR CFM-MCNC: NEGATIVE NG/ML
PCP CTO UR CFM-MCNC: NEGATIVE NG/ML
PROPOXYPH CTO UR CFM-MCNC: NEGATIVE NG/ML

## 2024-08-01 DIAGNOSIS — F12.90 MARIJUANA USE: ICD-10-CM

## 2024-08-01 DIAGNOSIS — G89.29 CHRONIC PAIN OF BOTH KNEES: ICD-10-CM

## 2024-08-01 DIAGNOSIS — E66.01 CLASS 3 SEVERE OBESITY DUE TO EXCESS CALORIES WITHOUT SERIOUS COMORBIDITY WITH BODY MASS INDEX (BMI) OF 45.0 TO 49.9 IN ADULT (HCC): ICD-10-CM

## 2024-08-01 DIAGNOSIS — F54 PSYCHOLOGICAL FACTORS AFFECTING MORBID OBESITY (HCC): ICD-10-CM

## 2024-08-01 DIAGNOSIS — M25.561 CHRONIC PAIN OF BOTH KNEES: ICD-10-CM

## 2024-08-01 DIAGNOSIS — M25.562 CHRONIC PAIN OF BOTH KNEES: ICD-10-CM

## 2024-08-01 DIAGNOSIS — E66.01 PSYCHOLOGICAL FACTORS AFFECTING MORBID OBESITY (HCC): ICD-10-CM

## 2024-08-01 DIAGNOSIS — Z01.818 PRE-OP EVALUATION: Primary | ICD-10-CM

## 2024-09-05 ENCOUNTER — TELEPHONE (OUTPATIENT)
Dept: PRIMARY CARE | Facility: CLINIC | Age: 29
End: 2024-09-05
Payer: COMMERCIAL

## 2024-09-05 NOTE — TELEPHONE ENCOUNTER
Patient stopped by the office stating she needs a letter of medical necessity for Bariatric Surgery.  Attached is a example of what the letter needs to be like that the patient provided.

## 2024-09-09 PROBLEM — G47.30 SLEEP-DISORDERED BREATHING: Status: ACTIVE | Noted: 2024-04-24

## 2024-09-12 ENCOUNTER — APPOINTMENT (OUTPATIENT)
Dept: PRIMARY CARE | Facility: CLINIC | Age: 29
End: 2024-09-12
Payer: COMMERCIAL

## 2024-09-12 VITALS
OXYGEN SATURATION: 99 % | DIASTOLIC BLOOD PRESSURE: 86 MMHG | TEMPERATURE: 97.7 F | SYSTOLIC BLOOD PRESSURE: 128 MMHG | HEART RATE: 79 BPM | RESPIRATION RATE: 18 BRPM | HEIGHT: 67 IN | WEIGHT: 293 LBS | BODY MASS INDEX: 45.99 KG/M2

## 2024-09-12 DIAGNOSIS — M62.838 MUSCLE SPASM: Primary | ICD-10-CM

## 2024-09-12 DIAGNOSIS — M62.830 LUMBAR PARASPINAL MUSCLE SPASM: ICD-10-CM

## 2024-09-12 DIAGNOSIS — E66.01 CLASS 3 SEVERE OBESITY DUE TO EXCESS CALORIES WITHOUT SERIOUS COMORBIDITY WITH BODY MASS INDEX (BMI) OF 45.0 TO 49.9 IN ADULT (MULTI): ICD-10-CM

## 2024-09-12 DIAGNOSIS — M62.838 TRAPEZIUS MUSCLE SPASM: ICD-10-CM

## 2024-09-12 PROCEDURE — 3008F BODY MASS INDEX DOCD: CPT | Performed by: PHYSICIAN ASSISTANT

## 2024-09-12 PROCEDURE — 99214 OFFICE O/P EST MOD 30 MIN: CPT | Performed by: PHYSICIAN ASSISTANT

## 2024-09-12 RX ORDER — CELECOXIB 200 MG/1
200 CAPSULE ORAL 2 TIMES DAILY
Qty: 90 CAPSULE | Refills: 1 | Status: SHIPPED | OUTPATIENT
Start: 2024-09-12

## 2024-09-12 RX ORDER — LEVONORGESTREL 52 MG/1
INTRAUTERINE DEVICE INTRAUTERINE
COMMUNITY
Start: 2023-11-15

## 2024-09-12 RX ORDER — TIZANIDINE 4 MG/1
4 TABLET ORAL EVERY 8 HOURS PRN
Qty: 30 TABLET | Refills: 1 | Status: SHIPPED | OUTPATIENT
Start: 2024-09-12

## 2024-09-12 NOTE — PROGRESS NOTES
"Subjective   Patient ID: Elina Roland is a 29 y.o. female who presents for Follow-up (Pt here today for a follow up to discuss needing a letter to get bariatric surgery that she has been trying to lose weight past 2 years. States the Bariatric doctor gave her Topamax to help with appetite but she stopped due to Light headed and felt weird) and Back Pain (Upper back pain between shoulder blades that started 10 months ago but getting worse and then middle lower back pain going to left lower back and down to back of right thigh past 3 years getting worse. ).    HPI     Obese  - First saw me for it 9/9/22 - started Trulicity   - December - lost 20lbs on Trulicity  - After that saw [facility] - they gave Topamax but didn't like it because felt lightheaded   - Tried to eat better, more protein and lower carbs   - Also exercising at the gym 3 days a week - cardio and weight training     Back pain   - Started getting worse in her upper and lower back   - The other day was holding her baby and got stuck forward, had to have her daughter get hte baby because couldn't straighten out  - The back pain is radiating to the left hip   - It's happening every day past 3 months and getting worse   - Txs tried: stretching   - went to urgent care and they gave an injection and rx'd muscle relaxer - these helped a lot but now pain is coming back     Review of Systems   Musculoskeletal:  Positive for back pain.     Objective   /86   Pulse 79   Temp 36.5 °C (97.7 °F)   Resp 18   Ht 1.702 m (5' 7\")   Wt 138 kg (305 lb)   SpO2 99%   BMI 47.77 kg/m²     Physical Exam  Constitutional:       Appearance: Normal appearance.   Cardiovascular:      Rate and Rhythm: Normal rate and regular rhythm.      Pulses: Normal pulses.      Heart sounds: Normal heart sounds. No murmur heard.  Pulmonary:      Effort: Pulmonary effort is normal.      Breath sounds: Normal breath sounds.   Neurological:      Mental Status: She is alert. "   Psychiatric:         Mood and Affect: Mood and affect normal.         Behavior: Behavior normal.         Thought Content: Thought content normal.         Judgment: Judgment normal.       Assessment/Plan     Problem List Items Addressed This Visit       Class 3 severe obesity due to excess calories without serious comorbidity with body mass index (BMI) of 45.0 to 49.9 in adult (Multi)    Current Assessment & Plan     - First saw me for it 9/9/22 - started Trulicity   - December - lost 20lbs on Trulicity  - After that saw [facility] - they gave Topamax but didn't like it because felt lightheaded   - Trying to eat better, more protein and lower carbs   - Also exercising at the gym 3 days a week - cardio and weight training   - Wants to do bariatric surgery because very little progress with lifestyle modifications and medications           Other Visit Diagnoses       Muscle spasm    -  Primary    Relevant Medications    celecoxib (CeleBREX) 200 mg capsule    tiZANidine (Zanaflex) 4 mg tablet    Other Relevant Orders    Referral to Physical Therapy    Lumbar paraspinal muscle spasm        Relevant Medications    celecoxib (CeleBREX) 200 mg capsule    tiZANidine (Zanaflex) 4 mg tablet    Other Relevant Orders    Referral to Physical Therapy    Trapezius muscle spasm        Relevant Medications    celecoxib (CeleBREX) 200 mg capsule    tiZANidine (Zanaflex) 4 mg tablet    Other Relevant Orders    Referral to Physical Therapy

## 2024-09-13 ASSESSMENT — ENCOUNTER SYMPTOMS: BACK PAIN: 1

## 2024-09-13 NOTE — ASSESSMENT & PLAN NOTE
- First saw me for it 9/9/22 - started Trulicity   - December - lost 20lbs on Trulicity  - After that saw [facility] - they gave Topamax but didn't like it because felt lightheaded   - Trying to eat better, more protein and lower carbs   - Also exercising at the gym 3 days a week - cardio and weight training   - Wants to do bariatric surgery because very little progress with lifestyle modifications and medications

## 2024-09-20 ENCOUNTER — OFFICE VISIT (OUTPATIENT)
Dept: BARIATRICS/WEIGHT MGMT | Age: 29
End: 2024-09-20
Payer: COMMERCIAL

## 2024-09-20 VITALS — BODY MASS INDEX: 45.99 KG/M2 | WEIGHT: 293 LBS | HEIGHT: 67 IN

## 2024-09-20 DIAGNOSIS — F12.90 MARIJUANA USE: ICD-10-CM

## 2024-09-20 DIAGNOSIS — F54 PSYCHOLOGICAL FACTORS AFFECTING MORBID OBESITY: ICD-10-CM

## 2024-09-20 DIAGNOSIS — E66.01 CLASS 3 SEVERE OBESITY DUE TO EXCESS CALORIES WITHOUT SERIOUS COMORBIDITY WITH BODY MASS INDEX (BMI) OF 45.0 TO 49.9 IN ADULT: ICD-10-CM

## 2024-09-20 DIAGNOSIS — Z71.3 DIETARY COUNSELING AND SURVEILLANCE: ICD-10-CM

## 2024-09-20 DIAGNOSIS — Z01.818 PRE-OP EVALUATION: ICD-10-CM

## 2024-09-20 DIAGNOSIS — M25.561 CHRONIC PAIN OF BOTH KNEES: ICD-10-CM

## 2024-09-20 DIAGNOSIS — E66.01 PSYCHOLOGICAL FACTORS AFFECTING MORBID OBESITY: ICD-10-CM

## 2024-09-20 DIAGNOSIS — G89.29 CHRONIC PAIN OF BOTH KNEES: ICD-10-CM

## 2024-09-20 DIAGNOSIS — M25.562 CHRONIC PAIN OF BOTH KNEES: ICD-10-CM

## 2024-09-20 DIAGNOSIS — E66.01 MORBID OBESITY (HCC): Primary | ICD-10-CM

## 2024-09-20 PROCEDURE — G8428 CUR MEDS NOT DOCUMENT: HCPCS | Performed by: DIETITIAN, REGISTERED

## 2024-09-20 PROCEDURE — 97803 MED NUTRITION INDIV SUBSEQ: CPT | Performed by: DIETITIAN, REGISTERED

## 2024-09-20 PROCEDURE — G8417 CALC BMI ABV UP PARAM F/U: HCPCS | Performed by: DIETITIAN, REGISTERED

## 2024-09-22 LAB
AMPHET CTO UR CFM-MCNC: NEGATIVE NG/ML
BARBITURATES CTO UR CFM-MCNC: NEGATIVE NG/ML
BENZODIAZ CTO UR CFM-MCNC: NEGATIVE NG/ML
CANNABINOIDS CTO UR CFM-MCNC: NEGATIVE NG/ML
COCAINE CTO UR CFM-MCNC: NEGATIVE NG/ML
CREAT UR-MCNC: 75.2 MG/DL (ref 20–400)
DRUGS OF ABUSE COMMENT: NORMAL
METHADONE CTO UR CFM-MCNC: NEGATIVE NG/ML
OPIATES CTO UR CFM-MCNC: NEGATIVE NG/ML
PCP CTO UR CFM-MCNC: NEGATIVE NG/ML
PROPOXYPH CTO UR CFM-MCNC: NEGATIVE NG/ML

## 2024-10-24 DIAGNOSIS — Z01.818 PREOPERATIVE TESTING: ICD-10-CM

## 2024-10-24 DIAGNOSIS — E88.810 INSULIN RESISTANCE SYNDROME: ICD-10-CM

## 2024-10-24 DIAGNOSIS — E66.01 CLASS 3 SEVERE OBESITY DUE TO EXCESS CALORIES WITHOUT SERIOUS COMORBIDITY WITH BODY MASS INDEX (BMI) OF 45.0 TO 49.9 IN ADULT: Primary | ICD-10-CM

## 2024-10-24 DIAGNOSIS — E66.813 CLASS 3 SEVERE OBESITY DUE TO EXCESS CALORIES WITHOUT SERIOUS COMORBIDITY WITH BODY MASS INDEX (BMI) OF 45.0 TO 49.9 IN ADULT: Primary | ICD-10-CM

## 2024-10-25 ENCOUNTER — OFFICE VISIT (OUTPATIENT)
Dept: BARIATRICS/WEIGHT MGMT | Age: 29
End: 2024-10-25
Payer: COMMERCIAL

## 2024-10-25 VITALS — BODY MASS INDEX: 45.99 KG/M2 | WEIGHT: 293 LBS | HEIGHT: 67 IN

## 2024-10-25 DIAGNOSIS — E66.01 MORBID OBESITY: Primary | ICD-10-CM

## 2024-10-25 DIAGNOSIS — Z01.818 PREOPERATIVE TESTING: ICD-10-CM

## 2024-10-25 DIAGNOSIS — M25.562 CHRONIC PAIN OF BOTH KNEES: ICD-10-CM

## 2024-10-25 DIAGNOSIS — E88.810 INSULIN RESISTANCE SYNDROME: ICD-10-CM

## 2024-10-25 DIAGNOSIS — E66.813 CLASS 3 SEVERE OBESITY DUE TO EXCESS CALORIES WITHOUT SERIOUS COMORBIDITY WITH BODY MASS INDEX (BMI) OF 45.0 TO 49.9 IN ADULT: ICD-10-CM

## 2024-10-25 DIAGNOSIS — F54 PSYCHOLOGICAL FACTORS AFFECTING MORBID OBESITY: Primary | ICD-10-CM

## 2024-10-25 DIAGNOSIS — E66.01 PSYCHOLOGICAL FACTORS AFFECTING MORBID OBESITY: Primary | ICD-10-CM

## 2024-10-25 DIAGNOSIS — E66.01 CLASS 3 SEVERE OBESITY DUE TO EXCESS CALORIES WITHOUT SERIOUS COMORBIDITY WITH BODY MASS INDEX (BMI) OF 45.0 TO 49.9 IN ADULT: ICD-10-CM

## 2024-10-25 DIAGNOSIS — M25.561 CHRONIC PAIN OF BOTH KNEES: ICD-10-CM

## 2024-10-25 DIAGNOSIS — Z71.3 DIETARY COUNSELING AND SURVEILLANCE: ICD-10-CM

## 2024-10-25 DIAGNOSIS — G89.29 CHRONIC PAIN OF BOTH KNEES: ICD-10-CM

## 2024-10-25 LAB
ESTIMATED AVERAGE GLUCOSE: 85 MG/DL
HBA1C MFR BLD: 4.6 % (ref 4–6)
HCG UR QL: NEGATIVE
T4 FREE SERPL-MCNC: 1.26 NG/DL (ref 0.84–1.68)
TSH REFLEX: 1.06 UIU/ML (ref 0.44–3.86)

## 2024-10-25 PROCEDURE — 97803 MED NUTRITION INDIV SUBSEQ: CPT | Performed by: DIETITIAN, REGISTERED

## 2024-10-25 PROCEDURE — G8428 CUR MEDS NOT DOCUMENT: HCPCS | Performed by: DIETITIAN, REGISTERED

## 2024-10-25 PROCEDURE — G8417 CALC BMI ABV UP PARAM F/U: HCPCS | Performed by: DIETITIAN, REGISTERED

## 2024-10-25 PROCEDURE — 90832 PSYTX W PT 30 MINUTES: CPT | Performed by: PSYCHOLOGIST

## 2024-10-25 PROCEDURE — 1036F TOBACCO NON-USER: CPT | Performed by: PSYCHOLOGIST

## 2024-10-25 ASSESSMENT — PATIENT HEALTH QUESTIONNAIRE - PHQ9
6. FEELING BAD ABOUT YOURSELF - OR THAT YOU ARE A FAILURE OR HAVE LET YOURSELF OR YOUR FAMILY DOWN: NOT AT ALL
SUM OF ALL RESPONSES TO PHQ9 QUESTIONS 1 & 2: 0
10. IF YOU CHECKED OFF ANY PROBLEMS, HOW DIFFICULT HAVE THESE PROBLEMS MADE IT FOR YOU TO DO YOUR WORK, TAKE CARE OF THINGS AT HOME, OR GET ALONG WITH OTHER PEOPLE: NOT DIFFICULT AT ALL
SUM OF ALL RESPONSES TO PHQ QUESTIONS 1-9: 0
9. THOUGHTS THAT YOU WOULD BE BETTER OFF DEAD, OR OF HURTING YOURSELF: NOT AT ALL
2. FEELING DOWN, DEPRESSED OR HOPELESS: NOT AT ALL
SUM OF ALL RESPONSES TO PHQ QUESTIONS 1-9: 0
7. TROUBLE CONCENTRATING ON THINGS, SUCH AS READING THE NEWSPAPER OR WATCHING TELEVISION: NOT AT ALL
5. POOR APPETITE OR OVEREATING: NOT AT ALL
4. FEELING TIRED OR HAVING LITTLE ENERGY: NOT AT ALL
SUM OF ALL RESPONSES TO PHQ QUESTIONS 1-9: 0
3. TROUBLE FALLING OR STAYING ASLEEP: NOT AT ALL
8. MOVING OR SPEAKING SO SLOWLY THAT OTHER PEOPLE COULD HAVE NOTICED. OR THE OPPOSITE, BEING SO FIGETY OR RESTLESS THAT YOU HAVE BEEN MOVING AROUND A LOT MORE THAN USUAL: NOT AT ALL
1. LITTLE INTEREST OR PLEASURE IN DOING THINGS: NOT AT ALL
SUM OF ALL RESPONSES TO PHQ QUESTIONS 1-9: 0

## 2024-10-25 ASSESSMENT — ANXIETY QUESTIONNAIRES
7. FEELING AFRAID AS IF SOMETHING AWFUL MIGHT HAPPEN: NOT AT ALL
IF YOU CHECKED OFF ANY PROBLEMS ON THIS QUESTIONNAIRE, HOW DIFFICULT HAVE THESE PROBLEMS MADE IT FOR YOU TO DO YOUR WORK, TAKE CARE OF THINGS AT HOME, OR GET ALONG WITH OTHER PEOPLE: NOT DIFFICULT AT ALL
6. BECOMING EASILY ANNOYED OR IRRITABLE: NOT AT ALL
2. NOT BEING ABLE TO STOP OR CONTROL WORRYING: NOT AT ALL
1. FEELING NERVOUS, ANXIOUS, OR ON EDGE: NOT AT ALL
4. TROUBLE RELAXING: NOT AT ALL
GAD7 TOTAL SCORE: 0
5. BEING SO RESTLESS THAT IT IS HARD TO SIT STILL: NOT AT ALL
3. WORRYING TOO MUCH ABOUT DIFFERENT THINGS: NOT AT ALL

## 2024-10-25 NOTE — PATIENT INSTRUCTIONS
Bariatric surgery is a powerful tool to help you lose weight, but it's not a magic bullet. It's just one part of your weight loss journey and requires your active participation to be successful. The surgery helps you eat less, but you'll need to make permanent changes to your diet, exercise habits, and overall lifestyle to achieve and maintain your weight loss goals. These changes are crucial for long-term success. This is a lifelong process; the surgery is just the beginning. You'll need to commit to new eating habits, regular exercise, and ongoing medical follow-ups for the rest of your life. It is also important to have realistic expectations. While you will likely lose significant weight, you may not reach your 'ideal' body image. Focus on health improvements rather than just appearance.       Surgery doesn't automatically fix emotional or psychological issues related to eating. Many patients have great success, but it's because they put in the hard work after surgery. They changed their diets, started exercising regularly, and addressed their emotional relationship with food. Lasting results take time; this isn't a quick fix, but a gradual process of changing your relationship with food and your body. Building a strong support system is crucial. This journey is easier when you have friends, family, and professionals helping you stay accountable and motivated. There will be challenges along the way, like plateaus in weight loss or the temptation to return to old habits; it's normal, and we're here to support you through these obstacles.

## 2024-10-25 NOTE — PROGRESS NOTES
Nutrition follow up prior to surgery  Visit number:  6 out of 6 for Juancho en Y gastric bypass.    Initial Weight: 301 lbs    Wt Readings from Last 3 Encounters:   09/20/24 135.5 kg (298 lb 12.8 oz)   07/30/24 (!) 137.7 kg (303 lb 9.6 oz)   06/25/24 135.9 kg (299 lb 9.6 oz)     lost 1 lbs over 1 month, down 3 lbs from initial.    Previous nutrition goals:   Continue current intake  Need letter of medical necessity  Bring education manual to next visit    Nutrition goals: met    Patient has completed required nutrition appointments and has met nutrition goals.  Reviewed importance of preoperative weight loss to promote a safer and easier surgery.  Patient is cleared from nutrition for bariatric surgery at this time.  Will follow up preoperatively as needed.      PES Statement:  Overweight/Obesity related to a complex combination of decreased energy needs, disordered eating patterns, physical inactivity, and increased psychological/life stress as evidenced by BMI greater than 30 and inability to maintain a significant amount of weight loss through conventional weight loss interventions.    Intervention:  Reviewed previous goals and set new goals.  Stressed importance of preoperative weight loss/maintenance or surgery may be postponed or cancelled.  Provided continued education regarding diet and lifestyle changes for optimal success post bariatric surgery.  Encouragement and support provided.    Education materials provided:   none    Plan/Recommendations:   Continue weight loss efforts  Prepare for 2 week pre op diet    Follow up: 1 week post op    Total time involved in direct patient education: 15 minutes    Rut Tobar RD

## 2024-10-25 NOTE — PROGRESS NOTES
BARIATRIC PRE-SURGICAL BEHAVIORAL HEALTH FOLLOW UP  Gavin Sumner Psy.D., [facility]  Licensed Clinical Psychologist ([place] [id number])        Name: Ekaterina Heard  Date of Birth 1995  Visit Date: 10/25/2024   Time spent with Patient: 17 minutes.    Patient provided informed consent for behavioral health intervention. Discussed with patient the limits of confidentiality (i.e., abuse reporting, suicide intervention, review by treatment team, review by insurance company, etc.) and purpose of treatment. Patient indicated understanding.      SUBJECTIVE  Ekaterina presents for follow up of preoperative counseling, education, and behavioral support to assist with making behavior and lifestyle changes necessary for proposed bariatric surgery.      Previous Recommendations: Ekaterina must remain substance free for a minimum of 3 months prior to surgery, as evidenced by negative laboratory testing.     Ekaterina reports:     Doing well overall. Mother has come to visit from [place] for 12 weeks; also plans to come for Ekaterina's recovery after surgery.    Mood: \"Good\"  Sleep: \"Good\"  Medication: n/a    THC: denied; UDS completed 6/25/24, 7/30/24, and 9/20/24 all resulted negative for substance use.    Eating Habits: 3 meals and 2-3 snacks; has purchased bariatric MVI and calcium citrate.  Activity/Exercise: 30 minutes walking and resistance bands, 4 days per week.    Current Weight: 297.8 lbs -1 lbs in 1 month; -3.2 lbs since starting program  Current BMI: 46.64      OBJECTIVE  MENTAL STATUS EXAM:  Mood was \"good\" with Neutral/Euthymic(normal) affect.   Suicidal ideation was denied.   Homicidal ideation was denied.   Hygiene was good .  Dress was appropriate.   Behavior was Within Normal Limits with No observation or self-report of difficulties ambulating.   Attitude was Cooperative, Engageable, and Friendly.  Eye-contact was good.  Speech: rate - WNL, rhythm - WNL, volume - WNL.  Verbalizations were goal directed and

## 2024-10-28 ENCOUNTER — NURSE ONLY (OUTPATIENT)
Dept: BARIATRICS/WEIGHT MGMT | Age: 29
End: 2024-10-28

## 2024-10-28 DIAGNOSIS — E66.01 MORBID OBESITY: Primary | ICD-10-CM

## 2024-10-28 NOTE — PROGRESS NOTES
it off.    True of False Failure to follow pre-op liver shrinking diet may result in cancelation and/or rescheduling of surgery due to inadequate shrinking of fat in the liver.      14. What may happen when you eat foods high in fat and sugar after bariatric surgery?   a. Weight gain   b. Itchiness   c. Dumping syndrome   d. A & C     15. Why is it necessary to take vitamin and mineral supplements after bariatric surgery?   a. To strengthen your muscles.   b. To prevent serious nutritional deficiencies.   c. To prevent dumping syndrome.   d. It is not particularly helpful.     16. To increase my chance of successful weight loss and long-term maintenance, I should…   a. Attend support groups   b. Attend all follow-up appointments with the bariatric team.   c. Eat a well-balanced nutrition plan.   d. Participate in regular physical activity.   e. All the above     17. Right after surgery, how often should I be sipping fluids at a minimum?   a. Every 10-15 minutes   b. Every 30 minutes  c. Every 1 hour  d. Every 2 hours    18. All the foods listed below are examples of protein sources, except which one?  Meat  Greek yogurt  Cottage cheese  Whole wheat bread    19. How much time should it take to eat a meal?  10-15 minutes  20-30 minutes  5-10 minutes  15 minutes    20. Which medications must be avoided after bariatric surgery?  a. Prescription or over-the-counter medications recommended by your physician.  b. Medications for hypertension.  c. Medications for depression.  d. Lortab, Codeine, Vicodin and other prescribed pain relief medication.  e. Aspirin, Motrin (ibuprofen), and/or any other non-steroidal anti-inflammatory drugs.          21. Keys to long-term success with Bariatric Surgery are:  a. Commitment to lifestyle changes.  b. Daily exercise for the rest of your life.  c. Multivitamin and mineral supplements for the rest of your life.  d. Adequate water intake and a high-quality protein diet.  e. All of the

## 2024-11-05 ENCOUNTER — PREP FOR PROCEDURE (OUTPATIENT)
Dept: BARIATRICS/WEIGHT MGMT | Age: 29
End: 2024-11-05

## 2024-11-05 ENCOUNTER — PATIENT MESSAGE (OUTPATIENT)
Dept: BARIATRICS/WEIGHT MGMT | Age: 29
End: 2024-11-05

## 2024-11-05 DIAGNOSIS — E66.01 MORBID (SEVERE) OBESITY DUE TO EXCESS CALORIES: ICD-10-CM

## 2024-11-05 DIAGNOSIS — E88.810 METABOLIC SYNDROME: ICD-10-CM

## 2024-11-05 PROBLEM — G47.30 SLEEP APNEA, UNSPECIFIED: Status: ACTIVE | Noted: 2024-11-05

## 2024-11-05 PROBLEM — K21.9 GASTROESOPHAGEAL REFLUX DISEASE: Status: ACTIVE | Noted: 2024-11-05

## 2024-11-22 ENCOUNTER — OFFICE VISIT (OUTPATIENT)
Dept: BARIATRICS/WEIGHT MGMT | Age: 29
End: 2024-11-22
Payer: COMMERCIAL

## 2024-11-22 VITALS
HEIGHT: 67 IN | DIASTOLIC BLOOD PRESSURE: 74 MMHG | HEART RATE: 84 BPM | SYSTOLIC BLOOD PRESSURE: 128 MMHG | BODY MASS INDEX: 45.2 KG/M2 | OXYGEN SATURATION: 100 % | WEIGHT: 288 LBS

## 2024-11-22 DIAGNOSIS — E66.01 MORBID OBESITY: Primary | ICD-10-CM

## 2024-11-22 PROCEDURE — G8427 DOCREV CUR MEDS BY ELIG CLIN: HCPCS | Performed by: SURGERY

## 2024-11-22 PROCEDURE — G8484 FLU IMMUNIZE NO ADMIN: HCPCS | Performed by: SURGERY

## 2024-11-22 PROCEDURE — 99215 OFFICE O/P EST HI 40 MIN: CPT | Performed by: SURGERY

## 2024-11-22 PROCEDURE — G8417 CALC BMI ABV UP PARAM F/U: HCPCS | Performed by: SURGERY

## 2024-11-22 PROCEDURE — 1036F TOBACCO NON-USER: CPT | Performed by: SURGERY

## 2024-11-22 RX ORDER — HEPARIN SODIUM 5000 [USP'U]/ML
5000 INJECTION, SOLUTION INTRAVENOUS; SUBCUTANEOUS ONCE
OUTPATIENT
Start: 2024-11-22 | End: 2024-11-22

## 2024-11-22 RX ORDER — SODIUM CHLORIDE 0.9 % (FLUSH) 0.9 %
5-40 SYRINGE (ML) INJECTION PRN
OUTPATIENT
Start: 2024-11-22

## 2024-11-22 RX ORDER — ONDANSETRON 2 MG/ML
4 INJECTION INTRAMUSCULAR; INTRAVENOUS ONCE
OUTPATIENT
Start: 2024-11-22 | End: 2024-11-22

## 2024-11-22 RX ORDER — APREPITANT 125 MG/1
125 CAPSULE ORAL ONCE
Qty: 1 CAPSULE | Refills: 0 | Status: SHIPPED | OUTPATIENT
Start: 2024-11-22 | End: 2024-11-22

## 2024-11-22 RX ORDER — ACETAMINOPHEN 325 MG/1
1000 TABLET ORAL ONCE
OUTPATIENT
Start: 2024-11-22 | End: 2024-11-22

## 2024-11-22 RX ORDER — POLYETHYLENE GLYCOL 3350, SODIUM SULFATE ANHYDROUS, SODIUM BICARBONATE, SODIUM CHLORIDE, POTASSIUM CHLORIDE 236; 22.74; 6.74; 5.86; 2.97 G/4L; G/4L; G/4L; G/4L; G/4L
4 POWDER, FOR SOLUTION ORAL ONCE
Qty: 4000 ML | Refills: 0 | Status: SHIPPED | OUTPATIENT
Start: 2024-11-22 | End: 2024-11-22

## 2024-11-22 RX ORDER — SODIUM CHLORIDE, SODIUM LACTATE, POTASSIUM CHLORIDE, CALCIUM CHLORIDE 600; 310; 30; 20 MG/100ML; MG/100ML; MG/100ML; MG/100ML
INJECTION, SOLUTION INTRAVENOUS CONTINUOUS
OUTPATIENT
Start: 2024-11-22

## 2024-11-22 RX ORDER — SODIUM CHLORIDE 0.9 % (FLUSH) 0.9 %
5-40 SYRINGE (ML) INJECTION EVERY 12 HOURS SCHEDULED
OUTPATIENT
Start: 2024-11-22

## 2024-11-22 RX ORDER — SODIUM CHLORIDE 9 MG/ML
INJECTION, SOLUTION INTRAVENOUS PRN
OUTPATIENT
Start: 2024-11-22

## 2024-11-22 NOTE — PROGRESS NOTES
Pre-op History and Physical    Patient Name:  :  Hospital Day:   Ekaterina Heard 1995 [unfilled]     Date of Service: 2024    Subjective:      History of Present Illness:   Ekaterina Heard  is a 28 y.o. female referred by DARLENE Ling for morbid obesity.  Ekaterina Heard reports multiple episodes of weight loss and regain after multiple diet and exercise programs. There has not been an evaluation for sleep apnea.  No nicotine or alcohol use.  She started the pre-operative process for Bariatric surgery at [facility] earlier this year, and recently moved to [place].  She has some GERD sx.  She experienced some weight loss with Trulicity but weight loss did not continue.    Ekaterina Aguilera completed the pre-op process for Bariatric surgery and is currently on the pre-op diet. Per our Psychologist, Dr. Contreras, there appears to be no active psychiatric contraindications to patient receiving bariatric surgery at this time; there is no evidence of untreated/undertreated clinically significant general psychopathology or substance/alcohol use disorders.  Past Medical History:   Diagnosis Date    Anemia 2023    Anxiety and depression     Asthma 2005    Binge eating disorder 12/10/2021    Major depressive disorder 2023    Past Surgical History:   Procedure Laterality Date    UPPER GASTROINTESTINAL ENDOSCOPY N/A 3/27/2024    ESOPHAGOGASTRODUODENOSCOPY WITH BIOPSIES performed by Juan Ivey MD at [facility]        Family History   Problem Relation Age of Onset    Hypertension Mother     Arthritis Mother     Depression Mother     Diabetes Father     Depression Sister     Arthritis Sister     No Known Problems Brother     No Known Problems Brother     Pancreatic Cancer Maternal Grandmother     No Known Problems Maternal Grandfather     No Known Problems Paternal Grandmother     No Known Problems Paternal

## 2024-11-22 NOTE — H&P (VIEW-ONLY)
12.0 10/14/2023    HCT 39.6 10/12/2023     No results found for: \"INR\"  Radiology Reviewed:  [unfilled]     Diagnosis:      1. Morbid obesity        Assessment/Plan:            Ekaterina Heard  is a 29 y.o. female with   1. Morbid obesity       who reports multiple episodes of repeat weight-loss and re-gain with various programmatic weight loss programs such as Weight Watchers, Jamia Koko, and TOPS.     We again discussed that the most effective treatment for morbid obesity is bariatric surgery.  We discussed the risks and benefits of laparoscopic or robotic Juancho-en-Y gastric bypass.  We discussed that over 80% of patients undergoing gastric bypass are able to maintain anywhere from half to all of her excess weight off and studies that exceed 25 years.  We discussed that there are risks for infection, bleeding, pain, need for reoperation, death, and vitamin and mineral deficiencies associated with the surgery.  We discussed that nicotine use is contraindicated after gastric bypass due to the risk of marginal ulcer formation.    We again also discussed the risks and benefits of laparoscopic or robotic sleeve gastrectomy.  We discussed that most sleeve patients lose about 60 to 70% of their excess weight in a year and that review of multiple studies of 7 years in length reveal an average of 27% of sleeve patients regaining their lost weight and 19% requiring revision either for lack of weight loss or severe GERD.We discussed that there are risks for infection, bleeding, pain, need for reoperation, death, and vitamin and mineral deficiencies associated with the surgery.     We again discussed that if a hiatal hernia is observed at surgery, we would repair the hiatal hernia to avoid potential obstruction in the case of gastric bypass or severe GERD in the case of sleeve or duodenal switch.   We discussed the risks and benefits of laparoscopic or robotic hiatal hernia repair.  The risks include infection, bleeding,

## 2024-11-27 ENCOUNTER — HOSPITAL ENCOUNTER (OUTPATIENT)
Dept: PREADMISSION TESTING | Age: 29
Discharge: HOME | End: 2024-12-01

## 2024-11-27 VITALS
SYSTOLIC BLOOD PRESSURE: 150 MMHG | DIASTOLIC BLOOD PRESSURE: 63 MMHG | BODY MASS INDEX: 43.04 KG/M2 | WEIGHT: 284 LBS | OXYGEN SATURATION: 99 % | HEART RATE: 55 BPM | TEMPERATURE: 97.7 F | RESPIRATION RATE: 18 BRPM | HEIGHT: 68 IN

## 2024-11-30 ENCOUNTER — ANESTHESIA EVENT (OUTPATIENT)
Dept: OPERATING ROOM | Age: 29
DRG: 621 | End: 2024-11-30
Payer: COMMERCIAL

## 2024-12-02 ENCOUNTER — ANESTHESIA (OUTPATIENT)
Dept: OPERATING ROOM | Age: 29
DRG: 621 | End: 2024-12-02
Payer: COMMERCIAL

## 2024-12-02 ENCOUNTER — HOSPITAL ENCOUNTER (INPATIENT)
Age: 29
LOS: 1 days | Discharge: HOME | DRG: 621 | End: 2024-12-03
Attending: SURGERY | Admitting: SURGERY
Payer: COMMERCIAL

## 2024-12-02 DIAGNOSIS — E66.01 MORBID (SEVERE) OBESITY DUE TO EXCESS CALORIES: Primary | ICD-10-CM

## 2024-12-02 LAB
ABO/RH: NORMAL
ANTIBODY SCREEN: NORMAL
ERYTHROCYTE [DISTWIDTH] IN BLOOD BY AUTOMATED COUNT: 13.3 % (ref 11.5–14.5)
HCG, URINE, POC: NEGATIVE
HCT VFR BLD AUTO: 36.2 % (ref 37–47)
HGB BLD-MCNC: 11.8 G/DL (ref 12–16)
Lab: NORMAL
MCH RBC QN AUTO: 27.4 PG (ref 27–31.3)
MCHC RBC AUTO-ENTMCNC: 32.6 % (ref 33–37)
MCV RBC AUTO: 84.2 FL (ref 79.4–94.8)
NEGATIVE QC PASS/FAIL: NORMAL
PLATELET # BLD AUTO: 219 K/UL (ref 130–400)
POSITIVE QC PASS/FAIL: NORMAL
RBC # BLD AUTO: 4.3 M/UL (ref 4.2–5.4)
REASON FOR REJECTION: NORMAL
REJECTED TEST: NORMAL
WBC # BLD AUTO: 5.5 K/UL (ref 4.8–10.8)

## 2024-12-02 PROCEDURE — 86850 RBC ANTIBODY SCREEN: CPT

## 2024-12-02 PROCEDURE — 7100000001 HC PACU RECOVERY - ADDTL 15 MIN: Performed by: SURGERY

## 2024-12-02 PROCEDURE — 2709999900 HC NON-CHARGEABLE SUPPLY: Performed by: SURGERY

## 2024-12-02 PROCEDURE — 6360000002 HC RX W HCPCS: Performed by: ANESTHESIOLOGY

## 2024-12-02 PROCEDURE — 86900 BLOOD TYPING SEROLOGIC ABO: CPT

## 2024-12-02 PROCEDURE — 64488 TAP BLOCK BI INJECTION: CPT | Performed by: ANESTHESIOLOGY

## 2024-12-02 PROCEDURE — S2900 ROBOTIC SURGICAL SYSTEM: HCPCS | Performed by: SURGERY

## 2024-12-02 PROCEDURE — 2580000003 HC RX 258: Performed by: SURGERY

## 2024-12-02 PROCEDURE — 6360000002 HC RX W HCPCS: Performed by: SURGERY

## 2024-12-02 PROCEDURE — 0D164ZA BYPASS STOMACH TO JEJUNUM, PERCUTANEOUS ENDOSCOPIC APPROACH: ICD-10-PCS | Performed by: SURGERY

## 2024-12-02 PROCEDURE — 85027 COMPLETE CBC AUTOMATED: CPT

## 2024-12-02 PROCEDURE — 8E0W4CZ ROBOTIC ASSISTED PROCEDURE OF TRUNK REGION, PERCUTANEOUS ENDOSCOPIC APPROACH: ICD-10-PCS | Performed by: SURGERY

## 2024-12-02 PROCEDURE — 7100000000 HC PACU RECOVERY - FIRST 15 MIN: Performed by: SURGERY

## 2024-12-02 PROCEDURE — 36415 COLL VENOUS BLD VENIPUNCTURE: CPT

## 2024-12-02 PROCEDURE — A4217 STERILE WATER/SALINE, 500 ML: HCPCS | Performed by: SURGERY

## 2024-12-02 PROCEDURE — 86901 BLOOD TYPING SEROLOGIC RH(D): CPT

## 2024-12-02 PROCEDURE — 6360000002 HC RX W HCPCS

## 2024-12-02 PROCEDURE — 1210000000 HC MED SURG R&B

## 2024-12-02 PROCEDURE — 3700000000 HC ANESTHESIA ATTENDED CARE: Performed by: SURGERY

## 2024-12-02 PROCEDURE — 3700000001 HC ADD 15 MINUTES (ANESTHESIA): Performed by: SURGERY

## 2024-12-02 PROCEDURE — 2720000010 HC SURG SUPPLY STERILE: Performed by: SURGERY

## 2024-12-02 PROCEDURE — 2500000003 HC RX 250 WO HCPCS

## 2024-12-02 PROCEDURE — 0BQT4ZZ REPAIR DIAPHRAGM, PERCUTANEOUS ENDOSCOPIC APPROACH: ICD-10-PCS | Performed by: SURGERY

## 2024-12-02 PROCEDURE — 6370000000 HC RX 637 (ALT 250 FOR IP): Performed by: SURGERY

## 2024-12-02 PROCEDURE — 3600000019 HC SURGERY ROBOT ADDTL 15MIN: Performed by: SURGERY

## 2024-12-02 PROCEDURE — 3600000009 HC SURGERY ROBOT BASE: Performed by: SURGERY

## 2024-12-02 RX ORDER — BUPRENORPHINE HYDROCHLORIDE 0.32 MG/ML
INJECTION INTRAMUSCULAR; INTRAVENOUS
Status: DISCONTINUED | OUTPATIENT
Start: 2024-12-02 | End: 2024-12-02 | Stop reason: SDUPTHER

## 2024-12-02 RX ORDER — SODIUM CHLORIDE 0.9 % (FLUSH) 0.9 %
5-40 SYRINGE (ML) INJECTION PRN
Status: DISCONTINUED | OUTPATIENT
Start: 2024-12-02 | End: 2024-12-02 | Stop reason: HOSPADM

## 2024-12-02 RX ORDER — SODIUM CHLORIDE 9 MG/ML
INJECTION, SOLUTION INTRAVENOUS CONTINUOUS
Status: DISCONTINUED | OUTPATIENT
Start: 2024-12-02 | End: 2024-12-03 | Stop reason: HOSPADM

## 2024-12-02 RX ORDER — SODIUM CHLORIDE 9 MG/ML
INJECTION, SOLUTION INTRAVENOUS PRN
Status: DISCONTINUED | OUTPATIENT
Start: 2024-12-02 | End: 2024-12-02 | Stop reason: HOSPADM

## 2024-12-02 RX ORDER — ONDANSETRON 2 MG/ML
INJECTION INTRAMUSCULAR; INTRAVENOUS
Status: DISCONTINUED | OUTPATIENT
Start: 2024-12-02 | End: 2024-12-02 | Stop reason: SDUPTHER

## 2024-12-02 RX ORDER — FENTANYL CITRATE 0.05 MG/ML
50 INJECTION, SOLUTION INTRAMUSCULAR; INTRAVENOUS EVERY 10 MIN PRN
Status: DISCONTINUED | OUTPATIENT
Start: 2024-12-02 | End: 2024-12-02 | Stop reason: HOSPADM

## 2024-12-02 RX ORDER — ONDANSETRON 2 MG/ML
4 INJECTION INTRAMUSCULAR; INTRAVENOUS EVERY 6 HOURS PRN
Status: DISCONTINUED | OUTPATIENT
Start: 2024-12-02 | End: 2024-12-03 | Stop reason: HOSPADM

## 2024-12-02 RX ORDER — NALOXONE HYDROCHLORIDE 0.4 MG/ML
INJECTION, SOLUTION INTRAMUSCULAR; INTRAVENOUS; SUBCUTANEOUS PRN
Status: DISCONTINUED | OUTPATIENT
Start: 2024-12-02 | End: 2024-12-02 | Stop reason: HOSPADM

## 2024-12-02 RX ORDER — ONDANSETRON 2 MG/ML
4 INJECTION INTRAMUSCULAR; INTRAVENOUS
Status: DISCONTINUED | OUTPATIENT
Start: 2024-12-02 | End: 2024-12-02 | Stop reason: HOSPADM

## 2024-12-02 RX ORDER — SODIUM CHLORIDE 0.9 % (FLUSH) 0.9 %
5-40 SYRINGE (ML) INJECTION EVERY 12 HOURS SCHEDULED
Status: DISCONTINUED | OUTPATIENT
Start: 2024-12-02 | End: 2024-12-02 | Stop reason: HOSPADM

## 2024-12-02 RX ORDER — DIPHENHYDRAMINE HYDROCHLORIDE 50 MG/ML
12.5 INJECTION INTRAMUSCULAR; INTRAVENOUS
Status: DISCONTINUED | OUTPATIENT
Start: 2024-12-02 | End: 2024-12-02 | Stop reason: HOSPADM

## 2024-12-02 RX ORDER — ONDANSETRON 4 MG/1
4 TABLET, ORALLY DISINTEGRATING ORAL EVERY 8 HOURS PRN
Status: DISCONTINUED | OUTPATIENT
Start: 2024-12-02 | End: 2024-12-03 | Stop reason: HOSPADM

## 2024-12-02 RX ORDER — OXYCODONE HYDROCHLORIDE 5 MG/1
5 TABLET ORAL
Status: DISCONTINUED | OUTPATIENT
Start: 2024-12-02 | End: 2024-12-02 | Stop reason: HOSPADM

## 2024-12-02 RX ORDER — OXYCODONE HYDROCHLORIDE 5 MG/1
10 TABLET ORAL EVERY 4 HOURS PRN
Status: DISCONTINUED | OUTPATIENT
Start: 2024-12-03 | End: 2024-12-03 | Stop reason: HOSPADM

## 2024-12-02 RX ORDER — MIDAZOLAM HYDROCHLORIDE 1 MG/ML
INJECTION, SOLUTION INTRAMUSCULAR; INTRAVENOUS
Status: DISCONTINUED | OUTPATIENT
Start: 2024-12-02 | End: 2024-12-02 | Stop reason: SDUPTHER

## 2024-12-02 RX ORDER — ACETAMINOPHEN 500 MG
1000 TABLET ORAL ONCE
Status: COMPLETED | OUTPATIENT
Start: 2024-12-02 | End: 2024-12-02

## 2024-12-02 RX ORDER — SODIUM CHLORIDE 0.9 % (FLUSH) 0.9 %
5-40 SYRINGE (ML) INJECTION EVERY 12 HOURS SCHEDULED
Status: DISCONTINUED | OUTPATIENT
Start: 2024-12-02 | End: 2024-12-03 | Stop reason: HOSPADM

## 2024-12-02 RX ORDER — HYDROMORPHONE HYDROCHLORIDE 1 MG/ML
1 INJECTION, SOLUTION INTRAMUSCULAR; INTRAVENOUS; SUBCUTANEOUS
Status: DISCONTINUED | OUTPATIENT
Start: 2024-12-02 | End: 2024-12-03 | Stop reason: HOSPADM

## 2024-12-02 RX ORDER — LIDOCAINE HYDROCHLORIDE 20 MG/ML
INJECTION, SOLUTION INTRAVENOUS
Status: DISCONTINUED | OUTPATIENT
Start: 2024-12-02 | End: 2024-12-02 | Stop reason: SDUPTHER

## 2024-12-02 RX ORDER — SODIUM CHLORIDE 0.9 % (FLUSH) 0.9 %
5-40 SYRINGE (ML) INJECTION PRN
Status: DISCONTINUED | OUTPATIENT
Start: 2024-12-02 | End: 2024-12-03 | Stop reason: HOSPADM

## 2024-12-02 RX ORDER — ACETAMINOPHEN 325 MG/1
650 TABLET ORAL EVERY 6 HOURS
Status: DISCONTINUED | OUTPATIENT
Start: 2024-12-03 | End: 2024-12-03 | Stop reason: HOSPADM

## 2024-12-02 RX ORDER — MAGNESIUM HYDROXIDE 1200 MG/15ML
LIQUID ORAL CONTINUOUS PRN
Status: DISCONTINUED | OUTPATIENT
Start: 2024-12-02 | End: 2024-12-02 | Stop reason: HOSPADM

## 2024-12-02 RX ORDER — SODIUM CHLORIDE 9 MG/ML
INJECTION, SOLUTION INTRAVENOUS CONTINUOUS
Status: DISCONTINUED | OUTPATIENT
Start: 2024-12-02 | End: 2024-12-02 | Stop reason: HOSPADM

## 2024-12-02 RX ORDER — BUPIVACAINE HYDROCHLORIDE 2.5 MG/ML
INJECTION, SOLUTION EPIDURAL; INFILTRATION; INTRACAUDAL
Status: DISCONTINUED | OUTPATIENT
Start: 2024-12-02 | End: 2024-12-02 | Stop reason: SDUPTHER

## 2024-12-02 RX ORDER — HEPARIN SODIUM 5000 [USP'U]/ML
5000 INJECTION, SOLUTION INTRAVENOUS; SUBCUTANEOUS ONCE
Status: COMPLETED | OUTPATIENT
Start: 2024-12-02 | End: 2024-12-02

## 2024-12-02 RX ORDER — HEPARIN SODIUM 5000 [USP'U]/ML
2500 INJECTION, SOLUTION INTRAVENOUS; SUBCUTANEOUS 2 TIMES DAILY
Status: DISCONTINUED | OUTPATIENT
Start: 2024-12-02 | End: 2024-12-03 | Stop reason: HOSPADM

## 2024-12-02 RX ORDER — HEPARIN SODIUM 5000 [USP'U]/ML
2500 INJECTION, SOLUTION INTRAVENOUS; SUBCUTANEOUS 2 TIMES DAILY
Status: DISCONTINUED | OUTPATIENT
Start: 2024-12-02 | End: 2024-12-02

## 2024-12-02 RX ORDER — METOCLOPRAMIDE HYDROCHLORIDE 5 MG/ML
10 INJECTION INTRAMUSCULAR; INTRAVENOUS
Status: DISCONTINUED | OUTPATIENT
Start: 2024-12-02 | End: 2024-12-02 | Stop reason: HOSPADM

## 2024-12-02 RX ORDER — ONDANSETRON 2 MG/ML
4 INJECTION INTRAMUSCULAR; INTRAVENOUS ONCE
Status: COMPLETED | OUTPATIENT
Start: 2024-12-02 | End: 2024-12-02

## 2024-12-02 RX ORDER — OXYCODONE HYDROCHLORIDE 5 MG/1
5 TABLET ORAL EVERY 4 HOURS PRN
Status: DISCONTINUED | OUTPATIENT
Start: 2024-12-03 | End: 2024-12-03 | Stop reason: HOSPADM

## 2024-12-02 RX ORDER — GLYCOPYRROLATE 0.2 MG/ML
INJECTION INTRAMUSCULAR; INTRAVENOUS
Status: DISCONTINUED | OUTPATIENT
Start: 2024-12-02 | End: 2024-12-02 | Stop reason: SDUPTHER

## 2024-12-02 RX ORDER — DEXAMETHASONE SODIUM PHOSPHATE 10 MG/ML
INJECTION INTRAMUSCULAR; INTRAVENOUS
Status: DISCONTINUED | OUTPATIENT
Start: 2024-12-02 | End: 2024-12-02 | Stop reason: SDUPTHER

## 2024-12-02 RX ORDER — PROPOFOL 10 MG/ML
INJECTION, EMULSION INTRAVENOUS
Status: DISCONTINUED | OUTPATIENT
Start: 2024-12-02 | End: 2024-12-02 | Stop reason: SDUPTHER

## 2024-12-02 RX ORDER — ROCURONIUM BROMIDE 10 MG/ML
INJECTION, SOLUTION INTRAVENOUS
Status: DISCONTINUED | OUTPATIENT
Start: 2024-12-02 | End: 2024-12-02 | Stop reason: SDUPTHER

## 2024-12-02 RX ORDER — SODIUM CHLORIDE 9 MG/ML
INJECTION, SOLUTION INTRAVENOUS PRN
Status: DISCONTINUED | OUTPATIENT
Start: 2024-12-02 | End: 2024-12-03 | Stop reason: HOSPADM

## 2024-12-02 RX ORDER — MEPERIDINE HYDROCHLORIDE 25 MG/ML
12.5 INJECTION INTRAMUSCULAR; INTRAVENOUS; SUBCUTANEOUS
Status: DISCONTINUED | OUTPATIENT
Start: 2024-12-02 | End: 2024-12-02 | Stop reason: HOSPADM

## 2024-12-02 RX ORDER — FENTANYL CITRATE 50 UG/ML
INJECTION, SOLUTION INTRAMUSCULAR; INTRAVENOUS
Status: DISCONTINUED | OUTPATIENT
Start: 2024-12-02 | End: 2024-12-02 | Stop reason: SDUPTHER

## 2024-12-02 RX ADMIN — HEPARIN SODIUM 2500 UNITS: 5000 INJECTION INTRAVENOUS; SUBCUTANEOUS at 18:54

## 2024-12-02 RX ADMIN — BUPIVACAINE HYDROCHLORIDE 30 ML: 2.5 INJECTION, SOLUTION EPIDURAL; INFILTRATION; INTRACAUDAL at 07:02

## 2024-12-02 RX ADMIN — HYDROMORPHONE HYDROCHLORIDE 1 MG: 1 INJECTION, SOLUTION INTRAMUSCULAR; INTRAVENOUS; SUBCUTANEOUS at 11:42

## 2024-12-02 RX ADMIN — ACETAMINOPHEN 1000 MG: 500 TABLET ORAL at 06:38

## 2024-12-02 RX ADMIN — SUGAMMADEX 200 MG: 100 INJECTION, SOLUTION INTRAVENOUS at 10:07

## 2024-12-02 RX ADMIN — BUPRENORPHINE HYDROCHLORIDE 0.3 MG: 0.32 INJECTION INTRAMUSCULAR; INTRAVENOUS at 07:10

## 2024-12-02 RX ADMIN — FENTANYL CITRATE 50 MCG: 0.05 INJECTION, SOLUTION INTRAMUSCULAR; INTRAVENOUS at 10:41

## 2024-12-02 RX ADMIN — HYDROMORPHONE HYDROCHLORIDE 1 MG: 1 INJECTION, SOLUTION INTRAMUSCULAR; INTRAVENOUS; SUBCUTANEOUS at 22:18

## 2024-12-02 RX ADMIN — CEFAZOLIN 3000 MG: 3 INJECTION, POWDER, FOR SOLUTION INTRAVENOUS at 16:02

## 2024-12-02 RX ADMIN — ONDANSETRON 4 MG: 2 INJECTION INTRAMUSCULAR; INTRAVENOUS at 06:39

## 2024-12-02 RX ADMIN — CEFAZOLIN 3000 MG: 3 INJECTION, POWDER, FOR SOLUTION INTRAVENOUS at 07:46

## 2024-12-02 RX ADMIN — GLYCOPYRROLATE 0.2 MG: 0.2 INJECTION INTRAMUSCULAR; INTRAVENOUS at 08:03

## 2024-12-02 RX ADMIN — ROCURONIUM BROMIDE 30 MG: 10 INJECTION, SOLUTION INTRAVENOUS at 09:23

## 2024-12-02 RX ADMIN — ROCURONIUM BROMIDE 30 MG: 10 INJECTION, SOLUTION INTRAVENOUS at 08:06

## 2024-12-02 RX ADMIN — HEPARIN SODIUM 5000 UNITS: 5000 INJECTION INTRAVENOUS; SUBCUTANEOUS at 07:02

## 2024-12-02 RX ADMIN — MIDAZOLAM HYDROCHLORIDE 2 MG: 1 INJECTION, SOLUTION INTRAMUSCULAR; INTRAVENOUS at 07:00

## 2024-12-02 RX ADMIN — FENTANYL CITRATE 25 MCG: 50 INJECTION, SOLUTION INTRAMUSCULAR; INTRAVENOUS at 10:16

## 2024-12-02 RX ADMIN — SODIUM CHLORIDE: 9 INJECTION, SOLUTION INTRAVENOUS at 06:37

## 2024-12-02 RX ADMIN — FENTANYL CITRATE 50 MCG: 50 INJECTION, SOLUTION INTRAMUSCULAR; INTRAVENOUS at 07:36

## 2024-12-02 RX ADMIN — LIDOCAINE HYDROCHLORIDE 60 MG: 20 INJECTION, SOLUTION INTRAVENOUS at 07:36

## 2024-12-02 RX ADMIN — ROCURONIUM BROMIDE 50 MG: 10 INJECTION, SOLUTION INTRAVENOUS at 07:36

## 2024-12-02 RX ADMIN — BUPIVACAINE HYDROCHLORIDE 30 ML: 2.5 INJECTION, SOLUTION EPIDURAL; INFILTRATION; INTRACAUDAL at 07:05

## 2024-12-02 RX ADMIN — GLYCOPYRROLATE 0.2 MG: 0.2 INJECTION INTRAMUSCULAR; INTRAVENOUS at 07:58

## 2024-12-02 RX ADMIN — SODIUM CHLORIDE: 9 INJECTION, SOLUTION INTRAVENOUS at 11:41

## 2024-12-02 RX ADMIN — HYDROMORPHONE HYDROCHLORIDE 1 MG: 1 INJECTION, SOLUTION INTRAMUSCULAR; INTRAVENOUS; SUBCUTANEOUS at 18:54

## 2024-12-02 RX ADMIN — DEXAMETHASONE SODIUM PHOSPHATE 10 MG: 10 INJECTION INTRAMUSCULAR; INTRAVENOUS at 07:45

## 2024-12-02 RX ADMIN — HYDROMORPHONE HYDROCHLORIDE 1 MG: 1 INJECTION, SOLUTION INTRAMUSCULAR; INTRAVENOUS; SUBCUTANEOUS at 15:48

## 2024-12-02 RX ADMIN — PROPOFOL 200 MG: 10 INJECTION, EMULSION INTRAVENOUS at 07:36

## 2024-12-02 RX ADMIN — FENTANYL CITRATE 25 MCG: 50 INJECTION, SOLUTION INTRAMUSCULAR; INTRAVENOUS at 09:24

## 2024-12-02 RX ADMIN — ONDANSETRON 4 MG: 2 INJECTION INTRAMUSCULAR; INTRAVENOUS at 09:56

## 2024-12-02 RX ADMIN — SODIUM CHLORIDE: 9 INJECTION, SOLUTION INTRAVENOUS at 18:56

## 2024-12-02 RX ADMIN — ROCURONIUM BROMIDE 20 MG: 10 INJECTION, SOLUTION INTRAVENOUS at 08:48

## 2024-12-02 ASSESSMENT — PAIN SCALES - GENERAL
PAINLEVEL_OUTOF10: 6
PAINLEVEL_OUTOF10: 2
PAINLEVEL_OUTOF10: 4
PAINLEVEL_OUTOF10: 7
PAINLEVEL_OUTOF10: 6
PAINLEVEL_OUTOF10: 4
PAINLEVEL_OUTOF10: 0
PAINLEVEL_OUTOF10: 10
PAINLEVEL_OUTOF10: 3
PAINLEVEL_OUTOF10: 8
PAINLEVEL_OUTOF10: 3

## 2024-12-02 ASSESSMENT — PAIN DESCRIPTION - ORIENTATION
ORIENTATION: MID
ORIENTATION: RIGHT;LEFT;ANTERIOR
ORIENTATION: MID
ORIENTATION: RIGHT;LEFT
ORIENTATION: RIGHT;LEFT

## 2024-12-02 ASSESSMENT — PAIN SCALES - WONG BAKER
WONGBAKER_NUMERICALRESPONSE: NO HURT
WONGBAKER_NUMERICALRESPONSE: HURTS LITTLE MORE

## 2024-12-02 ASSESSMENT — PAIN - FUNCTIONAL ASSESSMENT
PAIN_FUNCTIONAL_ASSESSMENT: PREVENTS OR INTERFERES SOME ACTIVE ACTIVITIES AND ADLS
PAIN_FUNCTIONAL_ASSESSMENT: 0-10

## 2024-12-02 ASSESSMENT — PAIN DESCRIPTION - DESCRIPTORS
DESCRIPTORS: STABBING
DESCRIPTORS: ACHING;STABBING
DESCRIPTORS: STABBING
DESCRIPTORS: DISCOMFORT

## 2024-12-02 ASSESSMENT — PAIN DESCRIPTION - LOCATION
LOCATION: ABDOMEN
LOCATION: ABDOMEN;INCISION
LOCATION: ABDOMEN
LOCATION: ABDOMEN
LOCATION: ABDOMEN;INCISION
LOCATION: ABDOMEN

## 2024-12-02 ASSESSMENT — PAIN DESCRIPTION - PAIN TYPE: TYPE: SURGICAL PAIN

## 2024-12-02 NOTE — ANESTHESIA PROCEDURE NOTES
Peripheral Block    Patient location during procedure: pre-op  Reason for block: post-op pain management and at surgeon's request  Start time: 12/2/2024 7:00 AM  Staffing  Performed: anesthesiologist   Anesthesiologist: Kenny Haskins MD  Performed by: Kenny Haskins MD  Authorized by: Kenny Haskins MD    Preanesthetic Checklist  Completed: patient identified, IV checked, site marked, risks and benefits discussed, surgical/procedural consents, equipment checked, pre-op evaluation, timeout performed, anesthesia consent given, oxygen available and monitors applied/VS acknowledged  Peripheral Block   Patient position: supine  Prep: ChloraPrep  Provider prep: mask and sterile gloves (Sterile probe cover)  Patient monitoring: cardiac monitor, continuous pulse ox, frequent blood pressure checks and IV access  Block type: TAP  Laterality: bilateral  Injection technique: single-shot  Guidance: ultrasound guided  Local infiltration: bupivacaine  Infiltration strength: 0.25 %  Local infiltration: bupivacaine  Dose: 30 mLDose: 30 mL    Needle   Needle type: combined needle/nerve stimulator   Needle gauge: 22 G  Needle localization: anatomical landmarks and ultrasound guidance  Needle length: 5 cm  Assessment   Injection assessment: negative aspiration for heme, no paresthesia on injection and local visualized surrounding nerve on ultrasound  Paresthesia pain: immediately resolved  Slow fractionated injection: yes  Hemodynamics: stable  Outcomes: uncomplicated and patient tolerated procedure well    Additional Notes  Ultrasound guidance used to view needle for placement.    Ultrasound image stored,  printed and saved in patient chart.    Sterile probe cover used

## 2024-12-02 NOTE — ANESTHESIA PRE PROCEDURE
Status: Time of last liquid consumption: 0500 (sip of water with pill)                        Time of last solid consumption: 1800                        Date of last liquid consumption: 12/02/24                        Date of last solid food consumption: 11/30/24    BMI:   Wt Readings from Last 3 Encounters:   12/02/24 127 kg (280 lb)   11/27/24 128.8 kg (284 lb)   11/22/24 130.6 kg (288 lb)     Body mass index is 42.57 kg/m².    CBC:   Lab Results   Component Value Date/Time    WBC 8.2 10/12/2023 11:15 AM    RBC 4.53 10/12/2023 11:15 AM    HGB 12.0 10/14/2023 03:39 AM    HCT 39.6 10/12/2023 11:15 AM    MCV 87.4 10/12/2023 11:15 AM    RDW 13.6 10/12/2023 11:15 AM     10/12/2023 11:15 AM       CMP:   Lab Results   Component Value Date/Time     10/12/2023 11:15 AM    K 3.7 10/12/2023 11:15 AM     10/12/2023 11:15 AM    CO2 19 10/12/2023 11:15 AM    BUN 5 10/12/2023 11:15 AM    CREATININE 0.35 10/12/2023 11:15 AM    LABGLOM >60.0 10/12/2023 11:15 AM    GLUCOSE 140 10/12/2023 11:15 AM    CALCIUM 8.9 10/12/2023 11:15 AM    BILITOT 0.4 10/12/2023 11:15 AM    ALKPHOS 145 10/12/2023 11:15 AM    AST 15 10/12/2023 11:15 AM    ALT 8 10/12/2023 11:15 AM       POC Tests: No results for input(s): \"POCGLU\", \"POCNA\", \"POCK\", \"POCCL\", \"POCBUN\", \"POCHEMO\", \"POCHCT\" in the last 72 hours.    Coags: No results found for: \"PROTIME\", \"INR\", \"APTT\"    HCG (If Applicable):   Lab Results   Component Value Date    PREGTESTUR Negative 10/25/2024    HCGQUANT 17253.0 03/10/2023        ABGs: No results found for: \"PHART\", \"PO2ART\", \"QWO9SMV\", \"FBG6TJB\", \"BEART\", \"K8DMZBYF\"     Type & Screen (If Applicable):  Lab Results   Component Value Date    ABORH O POS 10/12/2023    LABANTI NEG 10/12/2023       Drug/Infectious Status (If Applicable):  Lab Results   Component Value Date/Time    HEPCAB NONREACTIVE 03/10/2023 01:10 PM       COVID-19 Screening (If Applicable):   Lab Results   Component Value Date/Time    COVID19 Not Detected

## 2024-12-02 NOTE — OP NOTE
the stomach was performed and the vertical staple lines of the gastric pouch were accomplished with one 60 mm blue load of the Da Lenora cutting stapling device.      A Ray-Yunier sponge was put in the area of dissection to help with minor hemostasis. It was later removed from the abdomen.    The transverse colon was then elevated vertically and the ligament of Treitz was exposed.  A distance of 130 cm distal from the ligament of Treitz was counted off.   At the 130 cm position the jejunum was divided with a white load of the Intuitive cutting and stapling device. The mesentery was then divided towards the root with the vessel sealer.  The antimesenteric border of the proximal Juancho limb  of intestine was then sewn to the posterior aspect  of the gastric pouch with a running 2-0 Stratafix suture. An enterotomy was made in the small intestine using robotic scissor electrocautery. The VISI G-tube was then pushed down into the stomach pouch and the gastrotomy was made with scissor electrocautery. The VISI G-tube was then withdrawn a few inches and clamped.  Enterotomies were about 25 mm in length.    A posterior inner layer of the 2 layer handsewn gastrojejunostomy was then performed by running a 2-O Strattafix suture. All excess suture and needles were removed from the abdomen. An anterior internal layer was then accomplished by running a 2-0 Strattafix suture from lateral to medial. The original Strattafix suture was then run in a Lembert manner from medial to lateral on the anterior surface of the gastrojejunostomy to complete the anterior second layer of the closure.      A distance of 160 cm was then counted distally on the Juancho limb.   There was at least 300 cm of common channel from the JJ to the terminal ileum. At the 160 cm position the Juancho limb and biliopancreatic limb were attached with two 2-O Stratafix sutures on the antimesenteric borders.  Enterotomies were made in both limbs with cautery scissors and a 60

## 2024-12-02 NOTE — INTERVAL H&P NOTE
Update History & Physical    The patient's History and Physical of November 22, 2024 was reviewed with the patient and I examined the patient. There was no change. The surgical site was confirmed by the patient and me.     Plan: The risks, benefits, expected outcome, and alternative to the recommended procedure have been discussed with the patient. Patient understands and wants to proceed with the procedure.     Electronically signed by Juan Ivey MD on 12/2/2024 at 10:18 AM      
Airway patent, TM normal bilaterally, normal appearing mouth, nose, throat, neck supple with full range of motion, no cervical adenopathy.

## 2024-12-02 NOTE — ANESTHESIA POSTPROCEDURE EVALUATION
Department of Anesthesiology  Postprocedure Note    Patient: Ekaterina Heard  MRN: [id number]  YOB: 1995  Date of evaluation: 12/2/2024    Procedure Summary       Date: 12/02/24 Room / Location: [address]    Anesthesia Start: 0730 Anesthesia Stop: 1020    Procedure: GASTRIC BYPASS SAFIA-EN-Y LAPAROSCOPIC/ROBOTIC with safia limb greater then 150 cm  , hiatal hernia repair modifier 22 (Abdomen) Diagnosis:       Sleep apnea, unspecified      Gastroesophageal reflux disease      Metabolic syndrome      Morbid (severe) obesity due to excess calories      (Sleep apnea, unspecified [G47.30])      (Gastroesophageal reflux disease [K21.9])      (Metabolic syndrome [E88.810])      (Morbid (severe) obesity due to excess calories [E66.01])    Surgeons: Juan Ivey MD Responsible Provider: Kenny Haskins MD    Anesthesia Type: General, Regional ASA Status: 3            Anesthesia Type: General, Regional    Jarrett Phase I: Jarrett Score: 5    Jarrett Phase II:      Anesthesia Post Evaluation    Patient location during evaluation: PACU  Patient participation: complete - patient participated  Level of consciousness: awake and sleepy but conscious  Pain score: 1  Airway patency: patent  Nausea & Vomiting: no nausea and no vomiting  Cardiovascular status: blood pressure returned to baseline and hemodynamically stable  Respiratory status: acceptable, nonlabored ventilation, spontaneous ventilation and face mask  Hydration status: euvolemic  Pain management: adequate        No notable events documented.

## 2024-12-03 VITALS
HEIGHT: 68 IN | HEART RATE: 80 BPM | WEIGHT: 284.5 LBS | BODY MASS INDEX: 43.12 KG/M2 | TEMPERATURE: 98.2 F | OXYGEN SATURATION: 97 % | RESPIRATION RATE: 20 BRPM | DIASTOLIC BLOOD PRESSURE: 73 MMHG | SYSTOLIC BLOOD PRESSURE: 129 MMHG

## 2024-12-03 LAB
BASOPHILS # BLD: 0 K/UL (ref 0–0.2)
BASOPHILS NFR BLD: 0 %
EOSINOPHIL # BLD: 0 K/UL (ref 0–0.7)
EOSINOPHIL NFR BLD: 0.1 %
ERYTHROCYTE [DISTWIDTH] IN BLOOD BY AUTOMATED COUNT: 13.6 % (ref 11.5–14.5)
HCT VFR BLD AUTO: 38.5 % (ref 37–47)
HGB BLD-MCNC: 12.3 G/DL (ref 12–16)
LYMPHOCYTES # BLD: 1.6 K/UL (ref 1–4.8)
LYMPHOCYTES NFR BLD: 16.4 %
MCH RBC QN AUTO: 27.7 PG (ref 27–31.3)
MCHC RBC AUTO-ENTMCNC: 31.9 % (ref 33–37)
MCV RBC AUTO: 86.7 FL (ref 79.4–94.8)
MONOCYTES # BLD: 0.6 K/UL (ref 0.2–0.8)
MONOCYTES NFR BLD: 5.9 %
NEUTROPHILS # BLD: 7.7 K/UL (ref 1.4–6.5)
NEUTS SEG NFR BLD: 77.2 %
PLATELET # BLD AUTO: 222 K/UL (ref 130–400)
RBC # BLD AUTO: 4.44 M/UL (ref 4.2–5.4)
WBC # BLD AUTO: 9.9 K/UL (ref 4.8–10.8)

## 2024-12-03 PROCEDURE — 6360000002 HC RX W HCPCS: Performed by: SURGERY

## 2024-12-03 PROCEDURE — 85025 COMPLETE CBC W/AUTO DIFF WBC: CPT

## 2024-12-03 PROCEDURE — 6370000000 HC RX 637 (ALT 250 FOR IP): Performed by: SURGERY

## 2024-12-03 PROCEDURE — 36415 COLL VENOUS BLD VENIPUNCTURE: CPT

## 2024-12-03 PROCEDURE — 2580000003 HC RX 258: Performed by: SURGERY

## 2024-12-03 RX ORDER — ACETAMINOPHEN 500 MG
500 TABLET ORAL 4 TIMES DAILY PRN
Qty: 120 TABLET | Refills: 0 | Status: SHIPPED | OUTPATIENT
Start: 2024-12-03

## 2024-12-03 RX ORDER — SIMETHICONE 80 MG
80 TABLET,CHEWABLE ORAL 4 TIMES DAILY PRN
Qty: 180 TABLET | Refills: 3 | Status: SHIPPED | OUTPATIENT
Start: 2024-12-03

## 2024-12-03 RX ORDER — OXYCODONE HYDROCHLORIDE 5 MG/1
5 TABLET ORAL EVERY 6 HOURS PRN
Qty: 15 TABLET | Refills: 0 | Status: SHIPPED | OUTPATIENT
Start: 2024-12-03 | End: 2024-12-07

## 2024-12-03 RX ORDER — ONDANSETRON 4 MG/1
4 TABLET, ORALLY DISINTEGRATING ORAL EVERY 8 HOURS PRN
Qty: 30 TABLET | Refills: 3 | Status: SHIPPED | OUTPATIENT
Start: 2024-12-03

## 2024-12-03 RX ADMIN — ACETAMINOPHEN 650 MG: 325 TABLET ORAL at 08:37

## 2024-12-03 RX ADMIN — HEPARIN SODIUM 2500 UNITS: 5000 INJECTION INTRAVENOUS; SUBCUTANEOUS at 08:38

## 2024-12-03 RX ADMIN — OXYCODONE 10 MG: 5 TABLET ORAL at 08:49

## 2024-12-03 RX ADMIN — CEFAZOLIN 3000 MG: 3 INJECTION, POWDER, FOR SOLUTION INTRAVENOUS at 00:14

## 2024-12-03 RX ADMIN — HYDROMORPHONE HYDROCHLORIDE 1 MG: 1 INJECTION, SOLUTION INTRAMUSCULAR; INTRAVENOUS; SUBCUTANEOUS at 02:10

## 2024-12-03 RX ADMIN — HYDROMORPHONE HYDROCHLORIDE 1 MG: 1 INJECTION, SOLUTION INTRAMUSCULAR; INTRAVENOUS; SUBCUTANEOUS at 06:06

## 2024-12-03 RX ADMIN — SODIUM CHLORIDE: 9 INJECTION, SOLUTION INTRAVENOUS at 03:43

## 2024-12-03 ASSESSMENT — PAIN - FUNCTIONAL ASSESSMENT: PAIN_FUNCTIONAL_ASSESSMENT: PREVENTS OR INTERFERES SOME ACTIVE ACTIVITIES AND ADLS

## 2024-12-03 ASSESSMENT — PAIN SCALES - WONG BAKER
WONGBAKER_NUMERICALRESPONSE: HURTS LITTLE MORE
WONGBAKER_NUMERICALRESPONSE: HURTS LITTLE MORE

## 2024-12-03 ASSESSMENT — PAIN DESCRIPTION - ORIENTATION
ORIENTATION: RIGHT;LEFT
ORIENTATION: MID;LOWER

## 2024-12-03 ASSESSMENT — PAIN DESCRIPTION - LOCATION
LOCATION: ABDOMEN
LOCATION: ABDOMEN;INCISION

## 2024-12-03 ASSESSMENT — PAIN DESCRIPTION - DESCRIPTORS
DESCRIPTORS: DISCOMFORT;PRESSURE
DESCRIPTORS: STABBING

## 2024-12-03 ASSESSMENT — PAIN SCALES - GENERAL
PAINLEVEL_OUTOF10: 6
PAINLEVEL_OUTOF10: 5
PAINLEVEL_OUTOF10: 6
PAINLEVEL_OUTOF10: 3
PAINLEVEL_OUTOF10: 3

## 2024-12-03 NOTE — PROGRESS NOTES
Shift assessment completed and documented, see flowsheets. VSS on RA. Dressings to abd CDI. EMI drained 50 mL of clear bloody drainage. Ambulated with pt to bathroom, tolerated well. Pain meds given for 6/10 pain. Ice chip provided. SCDs in place. Denies any other needs at this time. Safety maintained. Call light within reach.   
whole grains if your diet stage permits   Diarrhea Change in diet  May be lactose intolerant; common after bariatric surgery Eat slowly  Do not overeat  Do not eat and drink at the same time  Avoid high sugar, high fat, and spicy foods  Limit the amount of sugar free products (like sugar free candies). These products contain sugar alcohols that can cause diarrhea  Limit caffeine containing beverages    Call office if diarrhea persists, or stools become bloody or black and tarry   Hair thinning or shedding Lack of protein  This is normal and often reversable Eat a well-balanced diet with adequate protein (60-70 grams)  Drink plenty of fluids  Take your multivitamins daily  Try Biotin supplement daily in addition to your multivitamin  Special shampoos and hair care products to stimulate growth

## 2024-12-03 NOTE — PLAN OF CARE
Problem: Discharge Planning  Goal: Discharge to home or other facility with appropriate resources  12/3/2024 1220 by Celestina William RN  Outcome: Adequate for Discharge  12/2/2024 2252 by Paulette oJnes RN  Outcome: Progressing  Flowsheets  Taken 12/2/2024 1335 by Celestina William RN  Discharge to home or other facility with appropriate resources:   Arrange for needed discharge resources and transportation as appropriate   Identify barriers to discharge with patient and caregiver   Identify discharge learning needs (meds, wound care, etc)  Taken 12/2/2024 1201 by Celestina William RN  Discharge to home or other facility with appropriate resources:   Identify barriers to discharge with patient and caregiver   Arrange for needed discharge resources and transportation as appropriate   Identify discharge learning needs (meds, wound care, etc)     Problem: Pain  Goal: Verbalizes/displays adequate comfort level or baseline comfort level  12/3/2024 1220 by Celestina William RN  Outcome: Adequate for Discharge  12/2/2024 2252 by Paulette Jones RN  Outcome: Progressing     Problem: ABCDS Injury Assessment  Goal: Absence of physical injury  Outcome: Adequate for Discharge

## 2024-12-03 NOTE — DISCHARGE INSTRUCTIONS
INSTRUCCIONES PARA LIVIER CUIDADOS DESPUÉS DE LA CIRUGÍA   Podrá retirar los vendajes 48 horas después de la cirugía.  Puedes ducharte en cualquier momento. NO se bañe, use piscinas ni jacuzzis terrance 2 semanas.   Mantenga la genna y el pecho elevados 30 grados cuando duerma para evitar la regurgitación.     Puede usar bolsas de hielo en las incisiones según sea necesario para aliviar el dolor.     Puede edward Tylenol según sea necesario para el dolor, además del analgésico que le hayan recetado. Siga las instrucciones en la botella. No consuma más de 3500 mg de paracetamol (Tylenol) al día.     Camine un poco cada hora mientras esté despierto.     Siga alston dieta líquida bariátrica CLEAR terrance 2 semanas. Utilice agua con proteínas o inyecciones de proteínas para complementar las proteínas. Evite los batidos de proteínas terrance las primeras 2 semanas. Trate de consumir al menos 64 onzas de líquido cada día.     NO levante nada que pese más de 15 libras (aproximadamente cooper bolsa de comestibles) ni practique deportes de contacto terrance 2 semanas.     NO conduzca si está tomando analgésicos narcóticos.   Puede ducharse al día siguiente del elier. Reemplace las curitas después de la ducha   Mantenga la genna elevada 30 grados mientras duerme para evitar aspirar mientras duerme.    BUSQUE ATENCIÓN MÉDICA SI:   Hay enrojecimiento, hinchazón o dolor creciente en la herida.   Sale líquido (pus) de la herida.     Tiene cooper temperatura oral superior a 102° F (38,9° C).   Notas un mal olor proveniente de la herida o del apósito.   Las heridas se abren.   Desarrolla episodios de mareos o desmayos mientras está de pie.    BUSQUE ATENCIÓN MÉDICA INMEDIATA SI:   Desarrolla cooper erupción.   Tienes dificultad para respirar.   Usted desarrolla cooper reacción o tiene efectos secundarios a los medicamentos que le administraron.   Tiene dolor nuevo en los músculos de la pantorrilla o hinchazón en las piernas.    NO vaya jair al

## 2024-12-03 NOTE — PLAN OF CARE
Problem: Discharge Planning  Goal: Discharge to home or other facility with appropriate resources  Outcome: Progressing  Flowsheets  Taken 12/2/2024 1335 by Celestina William, RN  Discharge to home or other facility with appropriate resources:   Arrange for needed discharge resources and transportation as appropriate   Identify barriers to discharge with patient and caregiver   Identify discharge learning needs (meds, wound care, etc)  Taken 12/2/2024 1201 by Celestina William, RN  Discharge to home or other facility with appropriate resources:   Identify barriers to discharge with patient and caregiver   Arrange for needed discharge resources and transportation as appropriate   Identify discharge learning needs (meds, wound care, etc)     Problem: Pain  Goal: Verbalizes/displays adequate comfort level or baseline comfort level  Outcome: Progressing

## 2024-12-03 NOTE — PLAN OF CARE
Problem: Discharge Planning  Goal: Discharge to home or other facility with appropriate resources  12/3/2024 1259 by Celestina William RN  Outcome: Completed  12/3/2024 1220 by Celestina William RN  Outcome: Adequate for Discharge     Problem: Pain  Goal: Verbalizes/displays adequate comfort level or baseline comfort level  12/3/2024 1259 by Celestina William RN  Outcome: Completed  12/3/2024 1220 by Celestina William RN  Outcome: Adequate for Discharge     Problem: ABCDS Injury Assessment  Goal: Absence of physical injury  12/3/2024 1259 by Celestina William RN  Outcome: Completed  12/3/2024 1220 by Celestina William RN  Outcome: Adequate for Discharge

## 2024-12-03 NOTE — CARE COORDINATION
MET W/PT TO DISCUSS DISCHARGE WHICH IS HOME W/SPOUSE AND KIDS. DENIES HOME GOING NEEDS. PT TOLERATING BARIATRIC CLEAR LIQUID DIET AND IS AMBULATING. CURRENTLY DENIES POST-OP PAIN.   
Issues/Barriers to RETURNING to current housing: MEDICAL COMPLICATION, S/P GASTRIC BYPASS,   Potential Assistance needed at discharge: N/A            Potential DME: N/A   Patient expects to discharge to: House  Plan for transportation at discharge: Self    Financial    Payor: [place] [employer] / Plan: [employer] - CHOICE PLUS / Product Type: *No Product type* /     Does insurance require precert for SNF: Yes    Potential assistance Purchasing Medications: No  Meds-to-Beds request: Yes      [id number] - [place], [place] - [address] [phone] - F [phone]  [address] [address]  Phone: [phone] Fax: [phone]      Notes:    Factors facilitating achievement of predicted outcomes: Family support, Motivated, Cooperative, Pleasant, and Sense of humor    Barriers to discharge: Pain, Limited participation, Medical complications, Stairs at home, and Medication managment    Additional Case Management Notes: MET W/PT TO ASSESS NEEDS AND DISCUSS DISCHARGE PLAN WHICH IS HOME W/SPOUSE AND KIDS. PT DENIES HOME GOING NEEDS. PT IS INDEPENDENT OF ADLS AND IADLS. ON RA. NO DME, O2, OR HD. PT IS NOT A . FAMILY IS SUPPORTIVE.     The Plan for Transition of Care is related to the following treatment goals of Sleep apnea, unspecified [G47.30]  Gastroesophageal reflux disease [K21.9]  Metabolic syndrome [E88.810]  Morbid (severe) obesity due to excess calories [E66.01]  Morbid obesity [E66.01]    IF APPLICABLE: The Patient and/or patient representative Ekaterina and her family were provided with a choice of provider and agrees with the discharge plan. Freedom of choice list with basic dialogue that supports the patient's individualized plan of care/goals and shares the quality data associated with the providers was provided to: Patient   Patient Representative Name:       The Patient and/or Patient Representative Agree with the Discharge Plan? Yes    Anita David RN  Case

## 2024-12-03 NOTE — DISCHARGE INSTR - DIET
Good nutrition is important when healing from an illness, injury, or surgery.  Follow any nutrition recommendations given to you during your hospital stay.   If you were given an oral nutrition supplement while in the hospital, continue to take this supplement at home.  You can take it with meals, in-between meals, and/or before bedtime. These supplements can be purchased at most local grocery stores, pharmacies, and chain [employer]-stores.   If you have any questions about your diet or nutrition, call the hospital and ask for the dietitian.  Follow recommended diet by Bariatric Team    
no

## 2024-12-04 ENCOUNTER — TELEPHONE (OUTPATIENT)
Dept: BARIATRICS/WEIGHT MGMT | Age: 29
End: 2024-12-04

## 2024-12-04 DIAGNOSIS — E66.01 MORBID OBESITY: Primary | ICD-10-CM

## 2024-12-04 NOTE — DISCHARGE SUMMARY
Patient ID  Ekaterina Heard   [id number]  1995     Admit date: 12/2/2024    Discharge date and time: 12/3/2024  2:04 PM     Admitting Physician: Juan Ivey MD     Discharge Physician: Uche    Admission Diagnoses: Sleep apnea, unspecified [G47.30]  Gastroesophageal reflux disease [K21.9]  Metabolic syndrome [E88.810]  Morbid (severe) obesity due to excess calories [E66.01]  Morbid obesity [E66.01]    Discharge Diagnoses: ssame    Admission Condition: fair    Discharged Condition: fair    Indication for Admission: morbid obesity    Surgical Procedures: Robotic Juancho en Y gastric bypass    Hospital Course: Hospital Course: The patient was admitted to the surgical estrella following the above operation, and recovered uneventfully on the surgical estrella . At the time of discharge the patient was tolerating a Bariatric clear liquid diet, pain was controlled on PO medications, and was ambulating and voiding.     Consults: none    Discharge Exam:  Gen: alert, NAD  Lungs: Breathing comfortably on RA. No tachypnea, retractions, or increased WOB.  Abd: soft, ND, appropriately tender  Incision:  healing well    Disposition: Home.    Patient Instructions:   See D/C instructions    Activity: Do not lift anything heavier than 15 pounds for 2 weeks.    Diet: Bariatric full liquids  Wound Care: may shower.  Cover staples with band aids.    Juan Ivey MD   12/4/2024  2:17 PM      Juan Ivey MD, FASMBS, FACS, MBSAQIP Verified Surgeon  LCDR-USN-RET, Diplomate of The American Board of Surgery

## 2024-12-04 NOTE — TELEPHONE ENCOUNTER
Bariatric Post-Op Discharge Follow-Up Call    Type of Surgery: GASTRIC BYPASS SAFIA-EN-Y LAPAROSCOPIC/ROBOTIC with safia limb greater then 150 cm  , hiatal hernia repair modifier 22  Date of Surgery: 12/2/2024  Hospital Discharge Date: 12/3/2024    Post-op outreach call made to pt on 12/4/2024.    Nausea present Yes  Vomiting present No  Nausea and/or vomiting managed by medication Yes  Fever >101/chills No  Chest pain/dyspnea/tachycardia No  Dysphagia to liquids No  Pain Yes. Patient rates pain at a 4      Pt reports frequent ambulation around home.     Intake is described as Fair  Fluid intake: Patient tolerating clear liquid diet. , Taking small sips every 10-15 minuets. , <48 oz/day, and Goal to aim for 64 oz a day  Protein intake: Patient to start protein drink/shots today.  Pt reports urine output of at least 4 times in a 24 hour period.     Passing flatus.  Agrees to monitor for BM. BM No .      Patient reports laparoscopic incision sites  no drainage and staples intact.   Drain removed prior to hospital discharge. Drainage at site: None    Lovenox  N/A  Taking as directed. .  Checking BP  No .   Checking Blood Sugar No .           Have you scheduled post-op appointment with primary care provider?  No  Patient instructed to call to schedule.    Future Appointments   Date Time Provider Department Center   12/12/2024  9:30 AM Rut Tobar RD [facility]   12/12/2024 10:15 AM Juan Ivey MD [facility]   1/9/2025 10:00 AM Gavin Sumner PSYD [facility]   1/9/2025 11:00 AM Rut Tobar RD [facility]   1/9/2025 11:30 AM Juan Ivey MD [facility]   .        Allowed pt the opportunity to ask questions.  Reminded patient to reference the patient education materials as needed. Reminded pt that they may phone the office or the \"after hours telephone number\"  that is listed in the patient education handbook

## 2024-12-05 ENCOUNTER — TELEPHONE (OUTPATIENT)
Dept: BARIATRICS/WEIGHT MGMT | Age: 29
End: 2024-12-05

## 2024-12-05 DIAGNOSIS — E66.01 MORBID OBESITY: Primary | ICD-10-CM

## 2024-12-12 ENCOUNTER — OFFICE VISIT (OUTPATIENT)
Dept: BARIATRICS/WEIGHT MGMT | Age: 29
End: 2024-12-12

## 2024-12-12 ENCOUNTER — OFFICE VISIT (OUTPATIENT)
Dept: BARIATRICS/WEIGHT MGMT | Age: 29
End: 2024-12-12
Payer: COMMERCIAL

## 2024-12-12 VITALS
BODY MASS INDEX: 42.38 KG/M2 | HEART RATE: 88 BPM | DIASTOLIC BLOOD PRESSURE: 80 MMHG | WEIGHT: 270 LBS | HEIGHT: 67 IN | OXYGEN SATURATION: 99 % | SYSTOLIC BLOOD PRESSURE: 120 MMHG

## 2024-12-12 VITALS — HEIGHT: 67 IN | WEIGHT: 270 LBS | BODY MASS INDEX: 42.38 KG/M2

## 2024-12-12 DIAGNOSIS — Z71.3 DIETARY COUNSELING AND SURVEILLANCE: ICD-10-CM

## 2024-12-12 DIAGNOSIS — E66.01 MORBID OBESITY: Primary | ICD-10-CM

## 2024-12-12 DIAGNOSIS — Z98.84 S/P GASTRIC BYPASS: Primary | ICD-10-CM

## 2024-12-12 DIAGNOSIS — Z98.84 S/P GASTRIC BYPASS: ICD-10-CM

## 2024-12-12 PROCEDURE — 97803 MED NUTRITION INDIV SUBSEQ: CPT | Performed by: DIETITIAN, REGISTERED

## 2024-12-12 PROCEDURE — 99024 POSTOP FOLLOW-UP VISIT: CPT | Performed by: SURGERY

## 2024-12-12 PROCEDURE — G8428 CUR MEDS NOT DOCUMENT: HCPCS | Performed by: DIETITIAN, REGISTERED

## 2024-12-12 PROCEDURE — G8417 CALC BMI ABV UP PARAM F/U: HCPCS | Performed by: DIETITIAN, REGISTERED

## 2024-12-12 NOTE — PATIENT INSTRUCTIONS
Keep up the hard work!      Start multi-vitamin and calcium supplementation.    THE BIG FOUR GUIDELINES:  1.  Count calories!  People who count calories eat less.  Use the daily plate or other apps for your smart phone or computer.  The more you count the more of an expert you will become and will get easier and easier.  If you are trying to lose weight and exercising a lot, keep calories to around the thousand to 1200 a day.  If you are not exercising consistently try to limit them to around 800 a day.    2.  Separate liquids and solids.  Wait 30 minutes to an hour after you eat before drinking liquids.  This will reduce the total amount of food you eat in the meal.    3.  Exercise!  You need up to 5 hours a week with a combination of cardio and resistance or weight training in order to keep excess body fat off.    4.  Sleep!   Try to get 7 or more hours of sleep a night.  If you have obstructive sleep apnea definitely use your CPAP machine.    THE RULE OF 20'S:  For every meal, chew each bite 20 seconds.  Then put the fork down and wait 20 seconds after you swallow before picking up the fork again.  Each meal should take at least 20 minutes so your brain can register that you are full.    If you are happy with your care, please go  to [email or web address] and/or [employer] and leave a review for Juan Ivey MD.  The charitable nature of our practice makes a marketing budget a challenge and your review could help us help more people.

## 2024-12-12 NOTE — PROGRESS NOTES
Medical Nutrition Therapy  [facility]    Patient here for her 1 week post op Juancho en Y gastric bypass.         12/12/2024  Height:   Ht Readings from Last 1 Encounters:   12/02/24 1.727 m (5' 8\")     Weight:   Wt Readings from Last 1 Encounters:   12/03/24 129 kg (284 lb 8 oz)     BMI:   BMI Readings from Last 1 Encounters:   12/03/24 43.26 kg/m²       Pre-Surgery Weight: 280 lbs  TBW lost: 10 lbs  % EBW lost: 8 %    Patient has lost a total of 31 lbs from start of program.    Labs reviewed: N/A    Intolerances/Difficulties: none, tolerating CL diet well    Vitamins:  to start today    Protein goal: met, 50 grams/day    Fluid goal: met, 60-64 oz/day     Exercise: walking daily    Nutrition Diagnosis:  PES: Overweight/Obesity related to a complex combination of decreased energy needs, disordered eating patterns, physical inactivity, and increased psychological/life stress as evidenced by BMI greater than 30 and inability to maintain a significant amount of weight loss through conventional weight loss interventions.    Nutrition Intervention:  Education provided to include review of post surgical diet progression, meal timing, protein and fluid intake, vitamins, and exercise.     Nutrition Monitoring and evaluation:  Protein intake: 74-92 grams/day (1.2-1.5gr/kg IBW)  Fluid intake: 64-90oz sugar free, calorie free, caffeine free, non carbonated beverages  Vitamins: Bariatric MVI with iron,  Calcium citrate 1000-1500mg/day in divided doses  Exercise: 45-60 minutes of exercise 5 days/week, including 3 days of strength training    Plan/Recommendation:   Advance to puree diet post op week 3  Increase protein to 60-70 grams/day  Begin taking Bariatric MVI with iron and Calcium citrate 500-600mg twice daily  Track daily intake    Time spent with patient 15 minutes    Follow up per program protocol    Rut Tobar RD

## 2024-12-12 NOTE — PROGRESS NOTES
Post Op Clinic Visit      Ekaterina Heard  is 1 week  post-op from Robotic Juancho en Y gastric bypass.    There have not been any fevers or chills.  Abdominal pain has been absent.  There has been no incisional redness or discharge.    Weight:       Wt Readings from Last 1 Encounters:   12/03/24 129 kg (284 lb 8 oz)      BMI:       BMI Readings from Last 1 Encounters:   12/03/24 43.26 kg/m²         Pre-Surgery Weight: 280 lbs  TBW lost: 10 lbs  % EBW lost: 8 %     Patient has lost a total of 31 lbs from start of program.     Labs reviewed: N/A     Intolerances/Difficulties: none, tolerating CL diet well     Vitamins:  to start today     Protein goal: met, 50 grams/day     Fluid goal: met, 60-64 oz/day     Exercise: walking daily     Nutrition Diagnosis:  PES: Overweight/Obesity related to a complex combination of decreased energy needs, disordered eating patterns, physical inactivity, and increased psychological/life stress as evidenced by BMI greater than 30 and inability to maintain a significant amount of weight loss through conventional weight loss interventions.     Nutrition Intervention:  Education provided to include review of post surgical diet progression, meal timing, protein and fluid intake, vitamins, and exercise.      Nutrition Monitoring and evaluation:  Protein intake: 74-92 grams/day (1.2-1.5gr/kg IBW)  Fluid intake: 64-90oz sugar free, calorie free, caffeine free, non carbonated beverages  Vitamins: Bariatric MVI with iron,  Calcium citrate 1000-1500mg/day in divided doses  Exercise: 45-60 minutes of exercise 5 days/week, including 3 days of strength training     Dietitian Plan/Recommendation:   Advance to puree diet post op week 3  Increase protein to 60-70 grams/day  Begin taking Bariatric MVI with iron and Calcium citrate 500-600mg twice daily  Track daily intake      Physical Exam:    /80   Pulse 88   Ht 1.702 m (5' 7\")   Wt 122.5 kg (270 lb)   LMP 12/02/2024   SpO2 99%   BMI

## 2024-12-17 ENCOUNTER — APPOINTMENT (OUTPATIENT)
Dept: PRIMARY CARE | Facility: CLINIC | Age: 29
End: 2024-12-17
Payer: COMMERCIAL

## 2024-12-23 ENCOUNTER — TELEPHONE (OUTPATIENT)
Dept: BARIATRICS/WEIGHT MGMT | Age: 29
End: 2024-12-23

## 2024-12-23 DIAGNOSIS — E66.01 MORBID OBESITY: Primary | ICD-10-CM

## 2024-12-23 NOTE — TELEPHONE ENCOUNTER
Received My Chart message regarding pain in 'belly button\" area. Call placed to patient. Patient reports pain in bellybutton area with sitting/standing. Rates pain at a \"5\". States pain is '0\" when laying down.  Discussed with patient pain at sight is common due to surgery, healing, and use of abdominal muscles with sitting/standing. Discussed taking Extra strength tylenol as needed. Discussed supporting abdominal area with small pillow when sitting or standing. Patient verbalizes understanding. Patient instructed to call office if pain does not improve or worsens, and/or development of nausea and vomiting. Patient verbalizes understanding.

## 2024-12-30 ENCOUNTER — APPOINTMENT (OUTPATIENT)
Dept: PRIMARY CARE | Facility: CLINIC | Age: 29
End: 2024-12-30
Payer: COMMERCIAL

## 2025-01-09 ENCOUNTER — OFFICE VISIT (OUTPATIENT)
Dept: BARIATRICS/WEIGHT MGMT | Age: 30
End: 2025-01-09

## 2025-01-09 ENCOUNTER — OFFICE VISIT (OUTPATIENT)
Dept: BARIATRICS/WEIGHT MGMT | Age: 30
End: 2025-01-09
Payer: COMMERCIAL

## 2025-01-09 VITALS
BODY MASS INDEX: 40.07 KG/M2 | DIASTOLIC BLOOD PRESSURE: 66 MMHG | OXYGEN SATURATION: 100 % | HEART RATE: 74 BPM | WEIGHT: 255.29 LBS | SYSTOLIC BLOOD PRESSURE: 106 MMHG | HEIGHT: 67 IN

## 2025-01-09 VITALS — BODY MASS INDEX: 40.07 KG/M2 | WEIGHT: 255.29 LBS | HEIGHT: 67 IN

## 2025-01-09 VITALS — WEIGHT: 255.2 LBS | HEIGHT: 67 IN | BODY MASS INDEX: 40.06 KG/M2

## 2025-01-09 DIAGNOSIS — E66.812 CLASS 2 OBESITY DUE TO EXCESS CALORIES WITHOUT SERIOUS COMORBIDITY WITH BODY MASS INDEX (BMI) OF 39.0 TO 39.9 IN ADULT: ICD-10-CM

## 2025-01-09 DIAGNOSIS — Z71.3 DIETARY COUNSELING AND SURVEILLANCE: ICD-10-CM

## 2025-01-09 DIAGNOSIS — Z98.84 BARIATRIC SURGERY STATUS: Primary | ICD-10-CM

## 2025-01-09 DIAGNOSIS — F50.814 BINGE EATING DISORDER IN REMISSION: ICD-10-CM

## 2025-01-09 DIAGNOSIS — Z98.84 S/P GASTRIC BYPASS: ICD-10-CM

## 2025-01-09 DIAGNOSIS — M25.561 CHRONIC PAIN OF BOTH KNEES: ICD-10-CM

## 2025-01-09 DIAGNOSIS — E66.09 CLASS 2 OBESITY DUE TO EXCESS CALORIES WITHOUT SERIOUS COMORBIDITY WITH BODY MASS INDEX (BMI) OF 39.0 TO 39.9 IN ADULT: ICD-10-CM

## 2025-01-09 DIAGNOSIS — M25.562 CHRONIC PAIN OF BOTH KNEES: ICD-10-CM

## 2025-01-09 DIAGNOSIS — F54 PSYCHOLOGICAL FACTORS AFFECTING MORBID OBESITY: Primary | ICD-10-CM

## 2025-01-09 DIAGNOSIS — E66.01 MORBID OBESITY: Primary | ICD-10-CM

## 2025-01-09 DIAGNOSIS — G89.29 CHRONIC PAIN OF BOTH KNEES: ICD-10-CM

## 2025-01-09 DIAGNOSIS — E66.01 PSYCHOLOGICAL FACTORS AFFECTING MORBID OBESITY: Primary | ICD-10-CM

## 2025-01-09 PROCEDURE — G8417 CALC BMI ABV UP PARAM F/U: HCPCS | Performed by: DIETITIAN, REGISTERED

## 2025-01-09 PROCEDURE — 99024 POSTOP FOLLOW-UP VISIT: CPT | Performed by: SURGERY

## 2025-01-09 PROCEDURE — G8428 CUR MEDS NOT DOCUMENT: HCPCS | Performed by: DIETITIAN, REGISTERED

## 2025-01-09 PROCEDURE — 90832 PSYTX W PT 30 MINUTES: CPT | Performed by: PSYCHOLOGIST

## 2025-01-09 PROCEDURE — 97803 MED NUTRITION INDIV SUBSEQ: CPT | Performed by: DIETITIAN, REGISTERED

## 2025-01-09 PROCEDURE — 1036F TOBACCO NON-USER: CPT | Performed by: PSYCHOLOGIST

## 2025-01-09 ASSESSMENT — PATIENT HEALTH QUESTIONNAIRE - PHQ9
SUM OF ALL RESPONSES TO PHQ QUESTIONS 1-9: 0
4. FEELING TIRED OR HAVING LITTLE ENERGY: NOT AT ALL
7. TROUBLE CONCENTRATING ON THINGS, SUCH AS READING THE NEWSPAPER OR WATCHING TELEVISION: NOT AT ALL
2. FEELING DOWN, DEPRESSED OR HOPELESS: NOT AT ALL
3. TROUBLE FALLING OR STAYING ASLEEP: NOT AT ALL
10. IF YOU CHECKED OFF ANY PROBLEMS, HOW DIFFICULT HAVE THESE PROBLEMS MADE IT FOR YOU TO DO YOUR WORK, TAKE CARE OF THINGS AT HOME, OR GET ALONG WITH OTHER PEOPLE: NOT DIFFICULT AT ALL
5. POOR APPETITE OR OVEREATING: NOT AT ALL
SUM OF ALL RESPONSES TO PHQ QUESTIONS 1-9: 0
SUM OF ALL RESPONSES TO PHQ9 QUESTIONS 1 & 2: 0
6. FEELING BAD ABOUT YOURSELF - OR THAT YOU ARE A FAILURE OR HAVE LET YOURSELF OR YOUR FAMILY DOWN: NOT AT ALL
SUM OF ALL RESPONSES TO PHQ QUESTIONS 1-9: 0
SUM OF ALL RESPONSES TO PHQ QUESTIONS 1-9: 0
1. LITTLE INTEREST OR PLEASURE IN DOING THINGS: NOT AT ALL
8. MOVING OR SPEAKING SO SLOWLY THAT OTHER PEOPLE COULD HAVE NOTICED. OR THE OPPOSITE, BEING SO FIGETY OR RESTLESS THAT YOU HAVE BEEN MOVING AROUND A LOT MORE THAN USUAL: NOT AT ALL
9. THOUGHTS THAT YOU WOULD BE BETTER OFF DEAD, OR OF HURTING YOURSELF: NOT AT ALL

## 2025-01-09 ASSESSMENT — ANXIETY QUESTIONNAIRES
IF YOU CHECKED OFF ANY PROBLEMS ON THIS QUESTIONNAIRE, HOW DIFFICULT HAVE THESE PROBLEMS MADE IT FOR YOU TO DO YOUR WORK, TAKE CARE OF THINGS AT HOME, OR GET ALONG WITH OTHER PEOPLE: NOT DIFFICULT AT ALL
6. BECOMING EASILY ANNOYED OR IRRITABLE: NOT AT ALL
5. BEING SO RESTLESS THAT IT IS HARD TO SIT STILL: NOT AT ALL
GAD7 TOTAL SCORE: 0
4. TROUBLE RELAXING: NOT AT ALL
3. WORRYING TOO MUCH ABOUT DIFFERENT THINGS: NOT AT ALL
7. FEELING AFRAID AS IF SOMETHING AWFUL MIGHT HAPPEN: NOT AT ALL
1. FEELING NERVOUS, ANXIOUS, OR ON EDGE: NOT AT ALL
2. NOT BEING ABLE TO STOP OR CONTROL WORRYING: NOT AT ALL

## 2025-01-09 NOTE — PROGRESS NOTES
BARIATRIC POST-OPERATIVE BEHAVIORAL HEALTH FOLLOW UP  Gavin Sumner Psy.D., [facility]  Licensed Clinical Psychologist ([id number])        Name: Ekaterina Heard  Date of Birth 1995  Visit Date: 1/9/2025   Time spent with Patient: 25 minutes.    Patient provided informed consent for behavioral health intervention. Discussed with patient the limits of confidentiality (i.e., abuse reporting, suicide intervention, review by treatment team, review by insurance company, etc.) and purpose of treatment. Patient indicated understanding.      SUBJECTIVE  Ekaterina presents for 1 month post-operative follow up with Bariatric Psychologist.    Initial Surgical Consultation 301.0 lbs BMI: 47.14   Pre-surgical Visit 288.0 lbs BMI: 45.10   Surgery 280.0 lbs BMI: 43.85   5 Weeks Post-op 255.2 lbs BMI: 39.97     Ekaterina has lost 45.8 pounds since the initial surgical consultation with Dr. Ivey, and 24.8 pounds since completion of the Juancho-en-Y Gastric Bypass procedure.      Ekaterina reports she has being doing \"really well\" since completion of RYGB.  Noted some post-op pain during first week post- but has resolved and feels good now; denied any issues with nausea/vomiting.     Denied any specific goal weight, noting she was 190 lbs when in high school; stated her focus is on improving health and overall quality of life.    Noted knee pain has decreased, but some back pain since returning to work; plans to speak with PCP about getting referral to PT.    Mood: \"Good\"  Sleep: \"Good\"  Medication: n/a  Appetite: \"Sometimes I don't feel like I'm hungry, but I just eat what I have to.\"     -appropriate for post-op stage    Eating Habits: 3 meals and 1-2 snacks per day  Snacking: cheese, deli meat, protein drink  Fluids: 64+ oz per day  Protein: approximately 60 g per day  Vitamins: taking bariatric MVI and calcium citrate daily  Alcohol: none  Nicotine: none  Caffeine: no; drinking decaf coffee  Activity/Exercise: \"Not yet; I

## 2025-01-09 NOTE — PROGRESS NOTES
Post Op Clinic Visit      Ekaterina Heard  is 1 month  post-op from Robotic Juancho en Y gastric bypass.   Abdominal pain has been absent.        Weight:       Wt Readings from Last 1 Encounters:   01/09/25 115.8 kg (255 lb 4.7 oz)      BMI:       BMI Readings from Last 1 Encounters:   01/09/25 39.98 kg/m²         Pre-Surgery Weight: 280 lbs  TBW lost: 25 lbs  % EBW lost: 21 %     Patient has lost a total of 46 lbs from start of program.     Labs reviewed: N/A     Intolerances/Difficulties: none     Vitamins:  Bariatric MVI with iron  and Calcium Citrate 1000-1500mg/day     Protein goal: met, 70 grams/day     Fluid goal: met, 64 oz/day     Exercise: none, waiting to be cleared     Nutrition Diagnosis:  PES: Overweight/Obesity related to a complex combination of decreased energy needs, disordered eating patterns, physical inactivity, and increased psychological/life stress as evidenced by BMI greater than 30 and inability to maintain a significant amount of weight loss through conventional weight loss interventions.     Nutrition Intervention:  Education provided to include review of post surgical diet progression, meal timing, protein and fluid intake, vitamins, and exercise.      Nutrition Monitoring and evaluation:  Protein intake: 74-92 grams/day (1.2-1.5gr/kg IBW)  Fluid intake: 64-90oz sugar free, calorie free, caffeine free, non carbonated beverages  Vitamins: Bariatric MVI with iron,  Calcium citrate 1000-1500mg/day in divided doses  Exercise: 45-60 minutes of exercise 5 days/week, including 3 days of strength training     Dietitian Plan/Recommendation:   Advance to regular diet post op week 7  Increase protein to 80-90 grams/day  Start regular exercise, combination cardio and strength training  Track daily intake       Physical Exam:    /66   Pulse 74   Ht 1.702 m (5' 7\")   Wt 115.8 kg (255 lb 4.7 oz)   SpO2 100%   BMI 39.98 kg/m²     General: No acute distress  HEENT: alessandra ZHAO scleral

## 2025-01-09 NOTE — PATIENT INSTRUCTIONS
Keep up the hard work!    Labs before next visit.    THE BIG FOUR GUIDELINES:  1.  Count calories!  People who count calories eat less.  Use the daily plate or other apps for your smart phone or computer.  The more you count the more of an expert you will become and will get easier and easier.  If you are trying to lose weight and exercising a lot, keep calories to around the thousand to 1200 a day.  If you are not exercising consistently try to limit them to around 800 a day.    2.  Separate liquids and solids.  Wait 30 minutes to an hour after you eat before drinking liquids.  This will reduce the total amount of food you eat in the meal.    3.  Exercise!  Any amount makes a big difference.    4.  Sleep!   Try to get 7 or more hours of sleep a night.  If you have obstructive sleep apnea definitely use your CPAP machine.    THE RULE OF 20'S:  For every meal, chew each bite 20 seconds.  Then put the fork down and wait 20 seconds after you swallow before picking up the fork again.  Each meal should take at least 20 minutes so your brain can register that you are full.    If you are happy with your care, please go  to [email or web address] and/or [employer] and leave a review for Juan Ivey MD.  The charitable nature of our practice makes a marketing budget a challenge and your review could help us help more people.

## 2025-01-09 NOTE — PROGRESS NOTES
Medical Nutrition Therapy  [facility]    Patient here for her 1 month post op Juancho en Y gastric bypass.         1/9/2025  Height:   Ht Readings from Last 1 Encounters:   01/09/25 1.702 m (5' 7\")     Weight:   Wt Readings from Last 1 Encounters:   01/09/25 115.8 kg (255 lb 4.7 oz)     BMI:   BMI Readings from Last 1 Encounters:   01/09/25 39.98 kg/m²       Pre-Surgery Weight: 280 lbs  TBW lost: 25 lbs  % EBW lost: 21 %    Patient has lost a total of 46 lbs from start of program.    Labs reviewed: N/A    Intolerances/Difficulties: none    Vitamins:  Bariatric MVI with iron  and Calcium Citrate 1000-1500mg/day    Protein goal: met, 70 grams/day    Fluid goal: met, 64 oz/day     Exercise: none, waiting to be cleared    Nutrition Diagnosis:  PES: Overweight/Obesity related to a complex combination of decreased energy needs, disordered eating patterns, physical inactivity, and increased psychological/life stress as evidenced by BMI greater than 30 and inability to maintain a significant amount of weight loss through conventional weight loss interventions.    Nutrition Intervention:  Education provided to include review of post surgical diet progression, meal timing, protein and fluid intake, vitamins, and exercise.     Nutrition Monitoring and evaluation:  Protein intake: 74-92 grams/day (1.2-1.5gr/kg IBW)  Fluid intake: 64-90oz sugar free, calorie free, caffeine free, non carbonated beverages  Vitamins: Bariatric MVI with iron,  Calcium citrate 1000-1500mg/day in divided doses  Exercise: 45-60 minutes of exercise 5 days/week, including 3 days of strength training    Plan/Recommendation:   Advance to regular diet post op week 7  Increase protein to 80-90 grams/day  Start regular exercise, combination cardio and strength training  Track daily intake    Time spent with patient 15 minutes    Follow up per program protocol    Rut Tobar RD

## 2025-05-09 ENCOUNTER — OFFICE VISIT (OUTPATIENT)
Age: 30
End: 2025-05-09
Payer: COMMERCIAL

## 2025-05-09 VITALS
OXYGEN SATURATION: 98 % | TEMPERATURE: 98 F | HEART RATE: 88 BPM | RESPIRATION RATE: 16 BRPM | BODY MASS INDEX: 33.78 KG/M2 | WEIGHT: 215.2 LBS | HEIGHT: 67 IN | DIASTOLIC BLOOD PRESSURE: 62 MMHG | SYSTOLIC BLOOD PRESSURE: 104 MMHG

## 2025-05-09 DIAGNOSIS — E55.9 VITAMIN D DEFICIENCY: Primary | ICD-10-CM

## 2025-05-09 DIAGNOSIS — Z98.84 S/P GASTRIC BYPASS: ICD-10-CM

## 2025-05-09 DIAGNOSIS — Z98.84 BARIATRIC SURGERY STATUS: ICD-10-CM

## 2025-05-09 DIAGNOSIS — E66.09 CLASS 1 OBESITY DUE TO EXCESS CALORIES WITHOUT SERIOUS COMORBIDITY WITH BODY MASS INDEX (BMI) OF 33.0 TO 33.9 IN ADULT: ICD-10-CM

## 2025-05-09 DIAGNOSIS — E55.9 VITAMIN D DEFICIENCY: ICD-10-CM

## 2025-05-09 DIAGNOSIS — E66.811 CLASS 1 OBESITY DUE TO EXCESS CALORIES WITHOUT SERIOUS COMORBIDITY WITH BODY MASS INDEX (BMI) OF 33.0 TO 33.9 IN ADULT: ICD-10-CM

## 2025-05-09 DIAGNOSIS — F50.814 BINGE EATING DISORDER IN REMISSION: ICD-10-CM

## 2025-05-09 DIAGNOSIS — F54 PSYCHOLOGICAL AND BEHAVIORAL FACTORS ASSOCIATED WITH DISORDERS OR DISEASES CLASSIFIED ELSEWHERE: Primary | ICD-10-CM

## 2025-05-09 LAB
ALBUMIN SERPL-MCNC: 4 G/DL (ref 3.5–4.6)
ERYTHROCYTE [DISTWIDTH] IN BLOOD BY AUTOMATED COUNT: 13.4 % (ref 11.5–14.5)
ESTIMATED AVERAGE GLUCOSE: 85 MG/DL
HBA1C MFR BLD: 4.6 % (ref 4–6)
HCT VFR BLD AUTO: 40.7 % (ref 37–47)
HGB BLD-MCNC: 12.5 G/DL (ref 12–16)
IRON % SATURATION: 37 % (ref 20–55)
IRON: 93 UG/DL (ref 37–145)
MCH RBC QN AUTO: 27.2 PG (ref 27–31.3)
MCHC RBC AUTO-ENTMCNC: 30.7 % (ref 33–37)
MCV RBC AUTO: 88.5 FL (ref 79.4–94.8)
PLATELET # BLD AUTO: 207 K/UL (ref 130–400)
PTH-INTACT SERPL-MCNC: 44.5 PG/ML (ref 15–65)
RBC # BLD AUTO: 4.6 M/UL (ref 4.2–5.4)
TOTAL IRON BINDING CAPACITY: 249 UG/DL (ref 250–450)
UNSATURATED IRON BINDING CAPACITY: 156 UG/DL (ref 112–347)
VITAMIN B-12: 586 PG/ML (ref 232–1245)
VITAMIN D 25-HYDROXY: 23.8 NG/ML (ref 30–100)
WBC # BLD AUTO: 6.7 K/UL (ref 4.8–10.8)

## 2025-05-09 PROCEDURE — 1036F TOBACCO NON-USER: CPT | Performed by: PSYCHOLOGIST

## 2025-05-09 PROCEDURE — 90832 PSYTX W PT 30 MINUTES: CPT | Performed by: PSYCHOLOGIST

## 2025-05-09 PROCEDURE — G8417 CALC BMI ABV UP PARAM F/U: HCPCS | Performed by: SURGERY

## 2025-05-09 PROCEDURE — 99215 OFFICE O/P EST HI 40 MIN: CPT | Performed by: SURGERY

## 2025-05-09 PROCEDURE — 1036F TOBACCO NON-USER: CPT | Performed by: SURGERY

## 2025-05-09 PROCEDURE — G8427 DOCREV CUR MEDS BY ELIG CLIN: HCPCS | Performed by: SURGERY

## 2025-05-09 RX ORDER — ERGOCALCIFEROL 1.25 MG/1
50000 CAPSULE, LIQUID FILLED ORAL WEEKLY
Qty: 24 CAPSULE | Refills: 1 | Status: SHIPPED | OUTPATIENT
Start: 2025-05-09

## 2025-05-09 ASSESSMENT — ANXIETY QUESTIONNAIRES
6. BECOMING EASILY ANNOYED OR IRRITABLE: NOT AT ALL
2. NOT BEING ABLE TO STOP OR CONTROL WORRYING: NOT AT ALL
7. FEELING AFRAID AS IF SOMETHING AWFUL MIGHT HAPPEN: NOT AT ALL
IF YOU CHECKED OFF ANY PROBLEMS ON THIS QUESTIONNAIRE, HOW DIFFICULT HAVE THESE PROBLEMS MADE IT FOR YOU TO DO YOUR WORK, TAKE CARE OF THINGS AT HOME, OR GET ALONG WITH OTHER PEOPLE: NOT DIFFICULT AT ALL
GAD7 TOTAL SCORE: 1
5. BEING SO RESTLESS THAT IT IS HARD TO SIT STILL: NOT AT ALL
4. TROUBLE RELAXING: SEVERAL DAYS
1. FEELING NERVOUS, ANXIOUS, OR ON EDGE: NOT AT ALL
3. WORRYING TOO MUCH ABOUT DIFFERENT THINGS: NOT AT ALL

## 2025-05-09 ASSESSMENT — PATIENT HEALTH QUESTIONNAIRE - PHQ9
10. IF YOU CHECKED OFF ANY PROBLEMS, HOW DIFFICULT HAVE THESE PROBLEMS MADE IT FOR YOU TO DO YOUR WORK, TAKE CARE OF THINGS AT HOME, OR GET ALONG WITH OTHER PEOPLE: NOT DIFFICULT AT ALL
4. FEELING TIRED OR HAVING LITTLE ENERGY: NOT AT ALL
SUM OF ALL RESPONSES TO PHQ QUESTIONS 1-9: 1
SUM OF ALL RESPONSES TO PHQ QUESTIONS 1-9: 1
9. THOUGHTS THAT YOU WOULD BE BETTER OFF DEAD, OR OF HURTING YOURSELF: NOT AT ALL
2. FEELING DOWN, DEPRESSED OR HOPELESS: NOT AT ALL
3. TROUBLE FALLING OR STAYING ASLEEP: SEVERAL DAYS
6. FEELING BAD ABOUT YOURSELF - OR THAT YOU ARE A FAILURE OR HAVE LET YOURSELF OR YOUR FAMILY DOWN: NOT AT ALL
7. TROUBLE CONCENTRATING ON THINGS, SUCH AS READING THE NEWSPAPER OR WATCHING TELEVISION: NOT AT ALL
5. POOR APPETITE OR OVEREATING: NOT AT ALL
SUM OF ALL RESPONSES TO PHQ QUESTIONS 1-9: 1
8. MOVING OR SPEAKING SO SLOWLY THAT OTHER PEOPLE COULD HAVE NOTICED. OR THE OPPOSITE, BEING SO FIGETY OR RESTLESS THAT YOU HAVE BEEN MOVING AROUND A LOT MORE THAN USUAL: NOT AT ALL
SUM OF ALL RESPONSES TO PHQ QUESTIONS 1-9: 1
1. LITTLE INTEREST OR PLEASURE IN DOING THINGS: NOT AT ALL

## 2025-05-09 NOTE — PROGRESS NOTES
concerns related to grazing or LOC eating; she denied any issues with food intolerance, nausea, and vomiting.       Diagnosis:    1. Psychological and behavioral factors associated with disorders or diseases classified elsewhere    2. S/P gastric bypass    3. Class 1 obesity due to excess calories without serious comorbidity with body mass index (BMI) of 33.0 to 33.9 in adult    4. Binge eating disorder in remission    5. Vitamin D deficiency             Diagnosis Date    Anemia 11/22/2023    Anxiety and depression 2023    Asthma 2005    Binge eating disorder 12/10/2021    Major depressive disorder 11/22/2023       PLAN  Follow up with Bariatric Psychologist 1 year post-op, or as needed to address emotional/behavioral issues affecting weight loss and/or postoperative success.

## 2025-05-09 NOTE — PATIENT INSTRUCTIONS
Follow up in 2 months for your 6 month post op visit.      THE BIG FOUR GUIDELINES:    1.  Count calories!  People who count calories eat less.  Use the daily plate or other apps for your smart phone or computer.  The more you count the more of an expert you will become and will get easier and easier.  If you are trying to lose weight and exercising a lot, keep calories to around the thousand to 1200 a day.  If you are not exercising consistently try to limit them to around 800 a day.    2.  Separate liquids and solids.  Wait 30 minutes to an hour after you eat before drinking liquids.  This will reduce the total amount of food you eat in the meal.    3.  Exercise!  Optimally,  up to 5 hours a week with a combination of cardio and resistance or weight training will dramatically help to keep excess body fat off.  But any bit of exercise makes a big, long-term difference.    4.  Sleep!   Try to get 7 or more hours of sleep a night.  If you have obstructive sleep apnea definitely use your CPAP machine.    THE RULE OF 20'S:  For every meal, chew each bite 20 seconds.  Then put the fork down and wait 20 seconds after you swallow before picking up the fork again.  Each meal should take at least 20 minutes so your brain can register that you are full.    If you are happy with your care, please go  to [email or web address]  and/or [employer] Reviews and leave a review for Juan Ivey MD.  The charitable nature of our practice makes a marketing budget a challenge and your review could help us help more people.

## 2025-05-09 NOTE — PROGRESS NOTES
Post Op Clinic Visit      Ekaterina Heard  is 5 months  post-op from Robotic Juancho en Y gastric bypass.   Abdominal pain has been absent.      Initial Surgical Consultation 301.0 lbs BMI: 47.14   Pre-surgical Visit 288.0 lbs BMI: 45.10   Surgery 280.0 lbs BMI: 43.85   5 Weeks Post-op 255.2 lbs BMI: 39.97   5 Months Post-op 215.2 lbs BMI: 33.71      Ekaterina has lost 85.8 pounds since the initial surgical consultation, and 64.8 pounds since completion of the Juancho-en-Y Gastric Bypass procedure.    %TBW lost: 23  %EBW lost: 44%         Physical Exam:    /62 (BP Site: Left Upper Arm, Patient Position: Sitting, BP Cuff Size: Large Adult)   Pulse 88   Temp 98 °F (36.7 °C) (Temporal)   Resp 16   Ht 1.702 m (5' 7\")   Wt 97.6 kg (215 lb 3.2 oz)   SpO2 98%   BMI 33.71 kg/m²     General: No acute distress  HEENT: P PERRLA, no scleral icterus.  Trachea midline.  Thoracic: Clear to auscultation bilaterally.  Cardiac: Regular rate and rhythm with no rubs clicks or murmurs.  Abdomen: The incisions are healing well.There are no hernias    Extremities: No clubbing cyanosis or edema.  Neurologic: No gross motor or sensory defects.        Assessment and Plan:      Ekaterina Heard   is 5  post-op from Robotic Juancho en Y gastric bypass.  and doing well.  Follow up in 2 months.    I am prescribing 50,000 IUs of vitamin D weekly.    In addition to the plan above, we discussed the following:    THE BIG FOUR GUIDELINES:  1.  Count calories!  People count calories eat less.  Use the daily plate or other apps for your smart phone or computer.  The more you count the more of an expert you will become and will get easier and easier.  If you are trying to lose weight and exercising a lot, keep calories to around the thousand to 1200 a day.  If you are not exercising consistently try to limit them to around 800 a day.    2.  Separate liquids and solids.  Wait 30 minutes to an hour after you eat before drinking liquids.   1840 Pt discharged via wheelchair.  Peripheral IV removed, pressure applied.  Pt discharge instructions reviewed with pt and mom (Kavitha) in parking lot.  Reviewed all meds with pt and mom.  All questions answered.  Clarified with MD that medications under ask how to take are pt current medications. Pt decline bath and wash up this shift.  Pt mom aware and will shower pt at home.

## 2025-05-11 LAB
CA-I ADJ PH7.4 SERPL-SCNC: 1.27 MMOL/L (ref 1.09–1.3)
CA-I SERPL ISE-SCNC: 1.24 MMOL/L (ref 1.09–1.3)

## 2025-05-13 LAB — VIT B1 BLD-MCNC: 130 NMOL/L (ref 70–180)

## (undated) DEVICE — BRUSH ENDO CLN L90.5IN SHTH DIA1.7MM BRIST DIA5-7MM 2-6MM

## (undated) DEVICE — SUTURE VICRYL + SZ 0 L27IN ABSRB VLT L26MM UR-6 5/8 CIR VCP603H

## (undated) DEVICE — STAPLER 60 RELOAD WHITE: Brand: SUREFORM

## (undated) DEVICE — SYRINGE MED 30ML STD CLR PLAS LUERLOCK TIP N CTRL DISP

## (undated) DEVICE — STAPLER 60 RELOAD BLUE: Brand: SUREFORM

## (undated) DEVICE — ENDOSCOPIC TRAY TRNSPRT 20.5X16.5X4.1 IN RECYCL SUGAR PULP

## (undated) DEVICE — ELECTRO LUBE IS A SINGLE PATIENT USE DEVICE THAT IS INTENDED TO BE USED ON ELECTROSURGICAL ELECTRODES TO REDUCE STICKING.: Brand: KEY SURGICAL ELECTRO LUBE

## (undated) DEVICE — ENDO CARRY-ON PROCEDURE KIT INCLUDES LUBRICANT, DEFENDO OLYMPUS AIR, WATER, SUCTION, BIOPSY VALVE KIT, ENZYMATIC SPONGE, AND BASIN.: Brand: ENDO CARRY-ON PROCEDURE KIT

## (undated) DEVICE — LABEL MED MINI W/ MARKER

## (undated) DEVICE — APPLICATOR MEDICATED 26 CC SOLUTION HI LT ORNG CHLORAPREP

## (undated) DEVICE — BLADELESS OBTURATOR: Brand: WECK VISTA

## (undated) DEVICE — SUTURE ABSORBABLE MONOFILAMENT 2-0 SH 6 IN STRATAFIX SPRL SXPP1B415

## (undated) DEVICE — TUBE SET 96 MM 64 MM H2O PERISTALTIC STD AUX CHANNEL

## (undated) DEVICE — FORCEPS BX L240CM JAW DIA2.8MM L CAP W/ NDL MIC MESH TOOTH

## (undated) DEVICE — SUTURE NONABSORBABLE MONOFILAMENT 3-0 PS-1 18 IN BLK ETHILON 1663H

## (undated) DEVICE — TIP COVER ACCESSORY

## (undated) DEVICE — SINGLE PORT MANIFOLD: Brand: NEPTUNE 2

## (undated) DEVICE — ARM DRAPE

## (undated) DEVICE — TTL1LYR 16FR10ML 100%SIL TMPST TR: Brand: MEDLINE

## (undated) DEVICE — GLOVE ORANGE PI 8   MSG9080

## (undated) DEVICE — SOLUTION ANTIFOG VIS SYS CLEARIFY LAPSCP

## (undated) DEVICE — VESSEL SEALER EXTEND: Brand: ENDOWRIST

## (undated) DEVICE — GENERAL LAPAROSCOPY: Brand: MEDLINE INDUSTRIES, INC.

## (undated) DEVICE — Device

## (undated) DEVICE — COLUMN DRAPE

## (undated) DEVICE — SEAL

## (undated) DEVICE — ADAPTER FLSH PMP FLD MGMT GI IRRIG OFP 2 DISPOSABLE

## (undated) DEVICE — RESERVOIR,SUCTION,100CC,SILICONE: Brand: MEDLINE

## (undated) DEVICE — STAPLER 60: Brand: SUREFORM

## (undated) DEVICE — DRAPE,UTILITY,TAPE,15X26,STERILE: Brand: MEDLINE

## (undated) DEVICE — VISIGI 3D®  CALIBRATION SYSTEM  SIZE 40FR STD W/ BULB: Brand: BOEHRINGER® VISIGI 3D™ SLEEVE GASTRECTOMY CALIBRATION SYSTEM, SIZE 40FR W/BULB

## (undated) DEVICE — GLOVE ORANGE PI 8 1/2   MSG9085

## (undated) DEVICE — SUCTION IRRIGATOR: Brand: ENDOWRIST

## (undated) DEVICE — CONMED SCOPE SAVER BITE BLOCK, 20X27 MM: Brand: SCOPE SAVER

## (undated) DEVICE — DRAIN SURG 19FR 0.25IN SIL RND W/ TRCR INDIC DOT RADPQ FULL

## (undated) DEVICE — GOWN,AURORA,NONREINFORCED,LARGE: Brand: MEDLINE

## (undated) DEVICE — Device: Brand: ENDO SMARTCAP

## (undated) DEVICE — BANDAGE ADH W2XL4IN NITRL FAB STRP CURAD